# Patient Record
Sex: FEMALE | Race: WHITE | NOT HISPANIC OR LATINO | Employment: FULL TIME | URBAN - METROPOLITAN AREA
[De-identification: names, ages, dates, MRNs, and addresses within clinical notes are randomized per-mention and may not be internally consistent; named-entity substitution may affect disease eponyms.]

---

## 2023-08-10 ENCOUNTER — HOSPITAL ENCOUNTER (OUTPATIENT)
Dept: DATA CONVERSION | Facility: HOSPITAL | Age: 58
Discharge: HOME | End: 2023-08-10

## 2023-08-10 DIAGNOSIS — E88.810 METABOLIC SYNDROME: ICD-10-CM

## 2023-08-10 LAB
ALBUMIN SERPL-MCNC: 4.3 GM/DL (ref 3.5–5)
ALBUMIN/GLOB SERPL: 1.4 RATIO (ref 1.5–3)
ALP BLD-CCNC: 99 U/L (ref 35–125)
ALT SERPL-CCNC: 19 U/L (ref 5–40)
ANION GAP SERPL CALCULATED.3IONS-SCNC: 12 MMOL/L (ref 0–19)
AST SERPL-CCNC: 19 U/L (ref 5–40)
BILIRUB SERPL-MCNC: 0.6 MG/DL (ref 0.1–1.2)
BUN SERPL-MCNC: 14 MG/DL (ref 8–25)
BUN/CREAT SERPL: 17.5 RATIO (ref 8–21)
CALCIUM SERPL-MCNC: 9.7 MG/DL (ref 8.5–10.4)
CHLORIDE SERPL-SCNC: 104 MMOL/L (ref 97–107)
CO2 SERPL-SCNC: 26 MMOL/L (ref 24–31)
CREAT SERPL-MCNC: 0.8 MG/DL (ref 0.4–1.6)
GFR SERPL CREATININE-BSD FRML MDRD: 87 ML/MIN/1.73 M2
GLOBULIN SER-MCNC: 3.1 G/DL (ref 1.9–3.7)
GLUCOSE SERPL-MCNC: 101 MG/DL (ref 65–99)
HBA1C MFR BLD: 6.5 % (ref 4–6)
POTASSIUM SERPL-SCNC: 3.8 MMOL/L (ref 3.4–5.1)
PROT SERPL-MCNC: 7.4 G/DL (ref 5.9–7.9)
SODIUM SERPL-SCNC: 142 MMOL/L (ref 133–145)

## 2023-09-18 ENCOUNTER — HOSPITAL ENCOUNTER (OUTPATIENT)
Dept: DATA CONVERSION | Facility: HOSPITAL | Age: 58
Discharge: HOME | End: 2023-09-18
Payer: COMMERCIAL

## 2023-09-18 DIAGNOSIS — E55.9 VITAMIN D DEFICIENCY, UNSPECIFIED: ICD-10-CM

## 2023-09-18 DIAGNOSIS — E78.5 HYPERLIPIDEMIA, UNSPECIFIED: ICD-10-CM

## 2023-09-18 PROBLEM — N95.1 FEMALE CLIMACTERIC STATE: Status: ACTIVE | Noted: 2023-09-18

## 2023-09-18 PROBLEM — R92.8 ABNORMAL MAMMOGRAM: Status: ACTIVE | Noted: 2023-09-18

## 2023-09-18 LAB
25(OH)D3 SERPL-MCNC: 50 NG/ML (ref 31–100)
APPEARANCE PLAS: ABNORMAL
CHOLEST SERPL-MCNC: 154 MG/DL (ref 133–200)
CHOLEST/HDLC SERPL: 3.4 RATIO
COLOR SPUN FLD: ABNORMAL
FASTING STATUS PATIENT QL REPORTED: ABNORMAL
HDLC SERPL-MCNC: 45 MG/DL
LDLC SERPL CALC-MCNC: 72 MG/DL (ref 65–130)
TRIGL SERPL-MCNC: 185 MG/DL (ref 40–150)

## 2023-09-18 RX ORDER — IBUPROFEN 200 MG
200 TABLET ORAL AS NEEDED
COMMUNITY
End: 2024-03-19 | Stop reason: ALTCHOICE

## 2023-09-20 PROBLEM — E88.810 METABOLIC SYNDROME: Status: ACTIVE | Noted: 2023-09-20

## 2023-10-23 ENCOUNTER — CLINICAL SUPPORT (OUTPATIENT)
Dept: CARE COORDINATION | Facility: CLINIC | Age: 58
End: 2023-10-23
Payer: COMMERCIAL

## 2023-10-23 NOTE — PROGRESS NOTES
Met with patient for nutrition counseling follow up. Her weight is 235 lbs, down one pound since our last visit. Her most recent A1c on 8/10/2023 was 6.5, that worries her. She states she is no longer on Ozempic, it made her very sick. Patient reports having a hard month, a close friend's daughter is terminally ill. She reports struggling with her emotions and emotional eating. She is really trying to remember that eating due to emotions will not help in the long run. She is also trying other methods of self care like talking to a friend, reading. She has been drinking flavored hot tea in the evening in place of something sweet and has been trying some new ways of cooking. She is really trying to make more meals from scratch rather than relying on frozen meals. She is also being a lot more aware of added sugar and portions, she is reading labels and limiting items that have added sugar. She is also still making effort to make healthier choices when out to eat. She reports still struggling a little with water intake. She plans on looking for a new water bottle with reminders on it. She feels this may help. We reviewed some ideas around mind set and creating behaviors that turn into lifestyle changes. We also reviewed portions, carbohydrates, added sugars, the plate method, and behaviors that will improve her A1c.  She reports feels optimistic and also anxious going into the holiday season with so many situations involving food. All questions were answered and I provided a few handouts on portions and using the plate method. Continue working on goals previously established: consistent exercise routine, consistent water intake ~64 oz daily, continue working on healthy cooking methods, continue limiting added sugars, continue using mindful techniques to limit emotional eating.

## 2023-11-06 ENCOUNTER — ANCILLARY PROCEDURE (OUTPATIENT)
Dept: RADIOLOGY | Facility: CLINIC | Age: 58
End: 2023-11-06
Payer: COMMERCIAL

## 2023-11-06 VITALS — BODY MASS INDEX: 42.17 KG/M2 | HEIGHT: 63 IN | WEIGHT: 238 LBS

## 2023-11-06 DIAGNOSIS — Z12.31 ENCOUNTER FOR SCREENING MAMMOGRAM FOR MALIGNANT NEOPLASM OF BREAST: ICD-10-CM

## 2023-11-06 PROCEDURE — 77067 SCR MAMMO BI INCL CAD: CPT

## 2023-12-04 ENCOUNTER — TELEMEDICINE CLINICAL SUPPORT (OUTPATIENT)
Dept: CARE COORDINATION | Facility: CLINIC | Age: 58
End: 2023-12-04
Payer: COMMERCIAL

## 2023-12-04 NOTE — PROGRESS NOTES
Met with patient for nutrition counseling follow up via agreed upon virtual visit. She took her weight at home, 235 lbs. The same since our last visit, she is glad because she expected to gain weight. She reports seeing endocrinologist. She was encouraged to decrease carbohydrates, Plans to retest A1c after the new year since she was at 6.5 8/2023. She recently started Metformin. She is very worried about a diabetes diagnosis. She feels when she is on a strict diet she cannot sustain it for very long and it makes her almost want to give up and over-indulge. We discussed the importance of making sustainable changes so they become part of her lifestyle. She is not good with breakfast and usually skips, She tried having a protein shake recently. She reports her schedule and routine has been different with the holidays. She has been eating out more and a lot more treats have been around. She does want to get back to an exercise routine. We discussed how this can help her blood sugar. We discussed a plan for getting back into an exercise routine. She had questions regarding lunch ideas, I provided some ideas and suggestions. We also reviewed the importance of vegetables at meals and limiting ultra processed carbohydrates such as cookies, donuts, chips, pretzels, etc. We discussed having fruit instead of other sweet treats. Together we came up with goals she feels she can work on going forward: no more than 2 fruits a day, re-start exercise routine with the goal to work up to 150 mins a week split up, increase vegetable intake at lunch and dinner, limit ultra processed carbohydrates, continue food log.

## 2024-01-22 ENCOUNTER — TELEMEDICINE CLINICAL SUPPORT (OUTPATIENT)
Dept: CARE COORDINATION | Facility: CLINIC | Age: 59
End: 2024-01-22
Payer: COMMERCIAL

## 2024-01-22 NOTE — PROGRESS NOTES
Met with patient for nutrition counseling follow up via agreed upon virtual visit. Patient reports her weight is about 241 lbs. She has re-assessed her approach and recently joined [employer]. She feels this will help having more community and group support, She also recently got a bluetooth scale that automatically records her weight on the liana. She feels this will be helpful, in the past she would leave off the decimal points when recording her weight or she would forget the number. So this will give better data to track. She reports still having some sweet treats around the house and this is challenging. She recently moved her treadmill from the corner of a spare room to her larger family room and she knows this will make it more accessible. She explains her  really likes to eat out, but she prefers to eat at home. She tries to find a balance, she wants to compromise with him but also is serious about her goals. They recently had a bad meal eating out and hopes this will deter him from offering to eat out. She is curious about ideas for healthier sweet treats when she has a craving. I provided some recipes via email. We discussed how change is difficult and creating new habits can take time. We discussed how it will become easier as habits become consistent. We discussed her source of motivation and having some heart to heart conversations with her  if needed. We also discussed getting rid of chocolate and other treats that are easy to overeat and how setting up her environment is so important. She is also challenged by drinking water, we discussed some strategies. All questions were answered. Goals to continue working on: tracking with [employer] liana, increasing water intake, remove candy dish, continue working on consistent exercise routine, limit ultra processed snacks such as chips.

## 2024-02-26 ENCOUNTER — APPOINTMENT (OUTPATIENT)
Dept: CARE COORDINATION | Facility: CLINIC | Age: 59
End: 2024-02-26
Payer: COMMERCIAL

## 2024-03-11 ENCOUNTER — TELEMEDICINE CLINICAL SUPPORT (OUTPATIENT)
Dept: CARE COORDINATION | Facility: CLINIC | Age: 59
End: 2024-03-11
Payer: COMMERCIAL

## 2024-03-11 NOTE — PROGRESS NOTES
Met with patient for nutrition counseling follow up via agreed upon virtual visit. She states her weight is 241 lbs, the same since our last visit. She explains she was sick and it has been lingering for about 3 weeks, her knee is popping and she has not been able to exercise. She explains weight watchers has not worked for her, she feels it was way too restrictive. It was a horrible experience for her. She feels confused on what to focus on but feels this is a good time to start fresh. She really wants to focus on nutrition since she is not able to exercise. She knows she needs to work on water intake, she got some sugar free flavoring for water and plans on trying that. She feels sharing her food diary with me will be helpful, I agreed and she plans on sending me her food diary so I can provide feedback. She also notices when she has a calorie goal she will try to fit in as many food items that she can. Ex; low calories items like sugar free popsicles or fudgsicles. We discussed how that is not necessarily a bad idea. However, some of those foods may not be filling or satisfying due to lacking in nutrition. We also discussed paying attention to protein, aiming to get a protein sources at each meal and snack and the importance of including protein at breakfast.   Goals until our next visit: work on increasing water intake, use sugar free water additives if needed, try fresh lemon or orange slices if you like too. Work on keeping a food diary and aim to stay around 7741-8461 calories a day and share with me for feedback. Aim to include protein at each meal and snacks when able, especially at breakfast. Protein sources- eggs, egg whites, lean meats, seafood, cottage cheese, yogurt, beans, nuts, edamame, quinoa.) Start paying attention to fullness after low calorie items like sugar free fudgsicles. Are they filling/satisfying? Also make a few lists of healthy foods that you enjoy in order to get some good ideas of  items to pick from.

## 2024-03-19 ENCOUNTER — LAB (OUTPATIENT)
Dept: LAB | Facility: LAB | Age: 59
End: 2024-03-19
Payer: COMMERCIAL

## 2024-03-19 ENCOUNTER — OFFICE VISIT (OUTPATIENT)
Dept: PRIMARY CARE | Facility: CLINIC | Age: 59
End: 2024-03-19
Payer: COMMERCIAL

## 2024-03-19 VITALS
HEART RATE: 104 BPM | TEMPERATURE: 97.6 F | BODY MASS INDEX: 43.16 KG/M2 | OXYGEN SATURATION: 97 % | WEIGHT: 243.6 LBS | DIASTOLIC BLOOD PRESSURE: 90 MMHG | SYSTOLIC BLOOD PRESSURE: 140 MMHG | HEIGHT: 63 IN

## 2024-03-19 DIAGNOSIS — R53.83 TIRED: ICD-10-CM

## 2024-03-19 DIAGNOSIS — E55.9 VITAMIN D DEFICIENCY: ICD-10-CM

## 2024-03-19 DIAGNOSIS — E03.9 ACQUIRED HYPOTHYROIDISM: ICD-10-CM

## 2024-03-19 DIAGNOSIS — E78.2 MIXED HYPERLIPIDEMIA: ICD-10-CM

## 2024-03-19 DIAGNOSIS — E66.01 CLASS 3 SEVERE OBESITY DUE TO EXCESS CALORIES WITH SERIOUS COMORBIDITY AND BODY MASS INDEX (BMI) OF 40.0 TO 44.9 IN ADULT (MULTI): ICD-10-CM

## 2024-03-19 DIAGNOSIS — E78.2 MIXED HYPERLIPIDEMIA: Primary | ICD-10-CM

## 2024-03-19 DIAGNOSIS — R73.9 HYPERGLYCEMIA: ICD-10-CM

## 2024-03-19 DIAGNOSIS — Z13.6 SCREENING FOR HEART DISEASE: ICD-10-CM

## 2024-03-19 LAB
25(OH)D3 SERPL-MCNC: 47 NG/ML (ref 30–100)
ALBUMIN SERPL BCP-MCNC: 4.6 G/DL (ref 3.4–5)
ALP SERPL-CCNC: 97 U/L (ref 33–110)
ALT SERPL W P-5'-P-CCNC: 18 U/L (ref 7–45)
ANION GAP SERPL CALC-SCNC: 13 MMOL/L (ref 10–20)
AST SERPL W P-5'-P-CCNC: 19 U/L (ref 9–39)
BASOPHILS # BLD AUTO: 0.1 X10*3/UL (ref 0–0.1)
BASOPHILS NFR BLD AUTO: 1.2 %
BILIRUB SERPL-MCNC: 0.4 MG/DL (ref 0–1.2)
BUN SERPL-MCNC: 14 MG/DL (ref 6–23)
CALCIUM SERPL-MCNC: 10.2 MG/DL (ref 8.6–10.3)
CHLORIDE SERPL-SCNC: 102 MMOL/L (ref 98–107)
CHOLEST SERPL-MCNC: 191 MG/DL (ref 0–199)
CHOLESTEROL/HDL RATIO: 3.5
CO2 SERPL-SCNC: 28 MMOL/L (ref 21–32)
CREAT SERPL-MCNC: 0.74 MG/DL (ref 0.5–1.05)
EGFRCR SERPLBLD CKD-EPI 2021: >90 ML/MIN/1.73M*2
EOSINOPHIL # BLD AUTO: 0.62 X10*3/UL (ref 0–0.7)
EOSINOPHIL NFR BLD AUTO: 7.7 %
ERYTHROCYTE [DISTWIDTH] IN BLOOD BY AUTOMATED COUNT: 13.6 % (ref 11.5–14.5)
EST. AVERAGE GLUCOSE BLD GHB EST-MCNC: 114 MG/DL
GLUCOSE SERPL-MCNC: 96 MG/DL (ref 74–99)
HBA1C MFR BLD: 5.6 %
HCT VFR BLD AUTO: 44.9 % (ref 36–46)
HDLC SERPL-MCNC: 55.1 MG/DL
HGB BLD-MCNC: 14.2 G/DL (ref 12–16)
IMM GRANULOCYTES # BLD AUTO: 0.03 X10*3/UL (ref 0–0.7)
IMM GRANULOCYTES NFR BLD AUTO: 0.4 % (ref 0–0.9)
LDLC SERPL CALC-MCNC: 75 MG/DL
LYMPHOCYTES # BLD AUTO: 2.65 X10*3/UL (ref 1.2–4.8)
LYMPHOCYTES NFR BLD AUTO: 33 %
MCH RBC QN AUTO: 28 PG (ref 26–34)
MCHC RBC AUTO-ENTMCNC: 31.6 G/DL (ref 32–36)
MCV RBC AUTO: 89 FL (ref 80–100)
MONOCYTES # BLD AUTO: 0.51 X10*3/UL (ref 0.1–1)
MONOCYTES NFR BLD AUTO: 6.4 %
NEUTROPHILS # BLD AUTO: 4.11 X10*3/UL (ref 1.2–7.7)
NEUTROPHILS NFR BLD AUTO: 51.3 %
NON HDL CHOLESTEROL: 136 MG/DL (ref 0–149)
NRBC BLD-RTO: 0 /100 WBCS (ref 0–0)
PLATELET # BLD AUTO: 310 X10*3/UL (ref 150–450)
POTASSIUM SERPL-SCNC: 4 MMOL/L (ref 3.5–5.3)
PROT SERPL-MCNC: 7.6 G/DL (ref 6.4–8.2)
RBC # BLD AUTO: 5.07 X10*6/UL (ref 4–5.2)
SODIUM SERPL-SCNC: 139 MMOL/L (ref 136–145)
TRIGL SERPL-MCNC: 305 MG/DL (ref 0–149)
TSH SERPL-ACNC: 3.56 MIU/L (ref 0.44–3.98)
VIT B12 SERPL-MCNC: 1253 PG/ML (ref 211–911)
VLDL: 61 MG/DL (ref 0–40)
WBC # BLD AUTO: 8 X10*3/UL (ref 4.4–11.3)

## 2024-03-19 PROCEDURE — 83036 HEMOGLOBIN GLYCOSYLATED A1C: CPT

## 2024-03-19 PROCEDURE — 1036F TOBACCO NON-USER: CPT | Performed by: FAMILY MEDICINE

## 2024-03-19 PROCEDURE — 82607 VITAMIN B-12: CPT

## 2024-03-19 PROCEDURE — 3008F BODY MASS INDEX DOCD: CPT | Performed by: FAMILY MEDICINE

## 2024-03-19 PROCEDURE — 82306 VITAMIN D 25 HYDROXY: CPT

## 2024-03-19 PROCEDURE — 36415 COLL VENOUS BLD VENIPUNCTURE: CPT

## 2024-03-19 PROCEDURE — 99204 OFFICE O/P NEW MOD 45 MIN: CPT | Performed by: FAMILY MEDICINE

## 2024-03-19 RX ORDER — SIMVASTATIN 20 MG/1
20 TABLET, FILM COATED ORAL DAILY
COMMUNITY

## 2024-03-19 RX ORDER — TITANIUM DIOXIDE, OCTINOXATE, ZINC OXIDE 4.61; 1.6; .78 G/40ML; G/40ML; G/40ML
1 CREAM TOPICAL
COMMUNITY

## 2024-03-19 RX ORDER — ACETAMINOPHEN 500 MG
5000 TABLET ORAL DAILY
COMMUNITY

## 2024-03-19 RX ORDER — LANOLIN ALCOHOL/MO/W.PET/CERES
1000 CREAM (GRAM) TOPICAL
COMMUNITY

## 2024-03-19 RX ORDER — BISACODYL 5 MG/1
1 TABLET, COATED ORAL
COMMUNITY

## 2024-03-19 RX ORDER — METFORMIN HYDROCHLORIDE 500 MG/1
1000 TABLET, EXTENDED RELEASE ORAL
Qty: 360 TABLET | Refills: 3 | Status: SHIPPED | OUTPATIENT
Start: 2024-03-19 | End: 2025-03-19

## 2024-03-19 RX ORDER — METFORMIN HYDROCHLORIDE 500 MG/1
1000 TABLET, EXTENDED RELEASE ORAL
COMMUNITY
End: 2024-03-19 | Stop reason: SDUPTHER

## 2024-03-19 ASSESSMENT — ENCOUNTER SYMPTOMS
DIZZINESS: 0
OCCASIONAL FEELINGS OF UNSTEADINESS: 0
SHORTNESS OF BREATH: 0
NAUSEA: 0
DIARRHEA: 0
FEVER: 0
DIFFICULTY URINATING: 0
DEPRESSION: 0
LOSS OF SENSATION IN FEET: 0
ENDOCRINE NEGATIVE: 1

## 2024-03-19 ASSESSMENT — PATIENT HEALTH QUESTIONNAIRE - PHQ9
2. FEELING DOWN, DEPRESSED OR HOPELESS: NOT AT ALL
SUM OF ALL RESPONSES TO PHQ9 QUESTIONS 1 AND 2: 0
1. LITTLE INTEREST OR PLEASURE IN DOING THINGS: NOT AT ALL

## 2024-03-19 ASSESSMENT — PAIN SCALES - GENERAL: PAINLEVEL: 0-NO PAIN

## 2024-03-19 NOTE — PROGRESS NOTES
"Subjective   Patient ID: Penelope Crouch is a 58 y.o. female who presents for NP est.    High cholesterol  Low vitamin d  Fatigue  Overweight/obesity-had tried phentermine in the past, has had DM injections-make her feel sick         Review of Systems   Constitutional:  Negative for fever.        Also see HPI   Eyes:  Negative for visual disturbance.   Respiratory:  Negative for shortness of breath.    Cardiovascular:  Negative for chest pain.   Gastrointestinal:  Negative for diarrhea and nausea.   Endocrine: Negative.    Genitourinary:  Negative for difficulty urinating.   Skin:  Negative for rash.   Neurological:  Negative for dizziness.        No focal deficits   Psychiatric/Behavioral:  Negative for suicidal ideas.    All other systems reviewed and are negative.      Objective   /90   Pulse 104   Temp 36.4 °C (97.6 °F)   Ht 1.594 m (5' 2.75\")   Wt 110 kg (243 lb 9.6 oz)   SpO2 97%   BMI 43.50 kg/m²     Physical Exam  Vitals and nursing note reviewed.   Constitutional:       Appearance: Normal appearance.   HENT:      Head: Normocephalic and atraumatic.   Eyes:      Extraocular Movements: Extraocular movements intact.      Conjunctiva/sclera: Conjunctivae normal.   Cardiovascular:      Rate and Rhythm: Normal rate and regular rhythm.      Heart sounds: Normal heart sounds.   Pulmonary:      Effort: Pulmonary effort is normal.      Breath sounds: Normal breath sounds.      Comments: Lungs essentially CTA b/l  Abdominal:      General: There is no distension.      Palpations: Abdomen is soft. There is no mass.      Tenderness: There is no abdominal tenderness.   Musculoskeletal:      Right lower leg: No edema.      Left lower leg: No edema.   Skin:     Coloration: Skin is not jaundiced.      Findings: No rash.   Neurological:      General: No focal deficit present.      Mental Status: She is alert and oriented to person, place, and time.   Psychiatric:         Mood and Affect: Mood normal.         " Behavior: Behavior normal.         Thought Content: Thought content normal.         Judgment: Judgment normal.       Assessment/Plan   Diagnoses and all orders for this visit:  Mixed hyperlipidemia  -     Comprehensive Metabolic Panel; Future  -     TSH with reflex to Free T4 if abnormal; Future  -     Lipid Panel; Future  -     CBC and Auto Differential; Future  -     Vitamin B12; Future  Screening for heart disease  -     Lipid Panel; Future  -     CT cardiac scoring wo IV contrast; Future  Hyperglycemia  -     metFORMIN  mg 24 hr tablet; Take 2 tablets (1,000 mg) by mouth 2 times a day with meals.  -     Comprehensive Metabolic Panel; Future  -     Vitamin B12; Future  -     Hemoglobin A1C; Future  Vitamin D deficiency  -     Vitamin D 25-Hydroxy,Total (for eval of Vitamin D levels); Future  Tired  -     Comprehensive Metabolic Panel; Future  -     TSH with reflex to Free T4 if abnormal; Future  -     CBC and Auto Differential; Future  -     Vitamin B12; Future  -     Vitamin D 25-Hydroxy,Total (for eval of Vitamin D levels); Future  Class 3 severe obesity due to excess calories with serious comorbidity and body mass index (BMI) of 40.0 to 44.9 in adult (CMS/[facility])

## 2024-03-19 NOTE — LETTER
March 19, 2024     Patient: Penelope Crouch   YOB: 1965   Date of Visit: 3/19/2024       To Whom It May Concern:    Penelope Crouch was seen in my clinic on 3/19/2024 at 8:00 am. Please excuse Penelope for her absence from work on this day to make the appointment.    If you have any questions or concerns, please don't hesitate to call.         Sincerely,         Scott Chaudhary,         CC: No Recipients

## 2024-03-22 ENCOUNTER — TELEPHONE (OUTPATIENT)
Dept: PRIMARY CARE | Facility: CLINIC | Age: 59
End: 2024-03-22
Payer: COMMERCIAL

## 2024-03-22 NOTE — TELEPHONE ENCOUNTER
Patient would like to have Mounjaro sent to the pharmacy to see if it will be covered. The Contrave was denied

## 2024-03-26 ENCOUNTER — TELEPHONE (OUTPATIENT)
Dept: PRIMARY CARE | Facility: CLINIC | Age: 59
End: 2024-03-26

## 2024-03-26 NOTE — TELEPHONE ENCOUNTER
Patient called to follow up david been sent to pharm to see if it will be covered since contrave was not cover

## 2024-03-27 NOTE — RESULT ENCOUNTER NOTE
Elevated triglyceride levels at 305 ------with normal levels defined as <150 mg/dL, hypertriglyceridemia can be classified as mild (triglycerides 151-499 mg/dL), moderate (triglycerides 500-999 mg/dL), and severe (triglycerides >1000 mg/dL).     Triglycerides are a type of fat that stores excess energy from your diet. Some tips for improving your triglyceride levels are:  1) The American Heart Association recommends 30 minutes of moderate-intensity aerobic exercise 5 days a week (or frequency to total 150 minutes a week)  2) Losing weight with goals of achieving a healthy BMI range of 18.5 to 24.9 kg/m2  3) Drinking alcohol in moderation   4) Avoiding smoking   5) Dietary changes:   -Avoid refined carbohydrates such as sugar and foods made with white flour or fructose which can increase triglycerides.  -Eating whole grains and cutting back on sugared soda can help control triglycerides.   -Omega-3 fats in salmon, tuna, sardines, and other fatty fish can lower triglycerides  -Cutting back on saturated fat in red meat and full-fat dairy foods  Despite having elevated triglyceride level of 305, overall cholesterol HDL ratio is 3.5 which is fantastic and the LDL number is 75 which is also fantastic  Hemoglobin A1c is 5.6 which is not in the diet range, this is a 3-month average glucose level marker  Although the TSH is 3.56 within the normal range, this could reflect a trend of underactive thyroid function, I recommend that we would have you check a TSH level in 6 weeks for reassessment, the order has been placed.  No fasting required to check this TSH level for thyroid function, no paperwork needed, just go to a  lab in approximately 6 weeks  The other labs are essentially normal  There is no problem with having an elevated vitamin B12 level

## 2024-04-03 ENCOUNTER — TELEMEDICINE (OUTPATIENT)
Dept: PRIMARY CARE | Facility: CLINIC | Age: 59
End: 2024-04-03
Payer: COMMERCIAL

## 2024-04-03 DIAGNOSIS — E78.2 MIXED HYPERLIPIDEMIA: Primary | ICD-10-CM

## 2024-04-03 DIAGNOSIS — E66.01 CLASS 3 SEVERE OBESITY DUE TO EXCESS CALORIES WITH SERIOUS COMORBIDITY AND BODY MASS INDEX (BMI) OF 40.0 TO 44.9 IN ADULT (MULTI): ICD-10-CM

## 2024-04-03 PROCEDURE — 99213 OFFICE O/P EST LOW 20 MIN: CPT | Performed by: FAMILY MEDICINE

## 2024-04-03 PROCEDURE — 3008F BODY MASS INDEX DOCD: CPT | Performed by: FAMILY MEDICINE

## 2024-04-03 PROCEDURE — 1036F TOBACCO NON-USER: CPT | Performed by: FAMILY MEDICINE

## 2024-04-03 RX ORDER — TIRZEPATIDE 2.5 MG/.5ML
2.5 INJECTION, SOLUTION SUBCUTANEOUS
Qty: 2 ML | Refills: 0 | Status: SHIPPED | OUTPATIENT
Start: 2024-04-03 | End: 2024-04-25

## 2024-04-15 ENCOUNTER — TELEMEDICINE CLINICAL SUPPORT (OUTPATIENT)
Dept: CARE COORDINATION | Facility: CLINIC | Age: 59
End: 2024-04-15
Payer: COMMERCIAL

## 2024-04-15 ENCOUNTER — APPOINTMENT (OUTPATIENT)
Dept: CARE COORDINATION | Facility: CLINIC | Age: 59
End: 2024-04-15
Payer: COMMERCIAL

## 2024-04-15 NOTE — PROGRESS NOTES
Met with patient for nutrition counseling follow up via agreed upon virtual visit. Patient reports her weight is 242 lbs, she is maintaining since our last visit. She explains life has been hectic helping take care of her mom. She finds it difficult to make herself a priority. She did do a food diary for a few days and states she notices where she is having trouble. Most of her calories are later in the day. We discussed how that encourages not being hungry in the morning. She explains her mornings are hectic and she is always in a rush, she typically likes to stay up later at night until she is exhausted. She states she will work on getting ready a healthy lunch and small breakfast the night before. She also plans on walking with her friend soon. She is having a hard time coming up with lunch ideas, we discussed a few ideas and I provided info via email. She also notices she drinks water during the day but not as much after work.   Goals until our next visit: fill up water bottle as soon as she gets home from work, start walking with her friend for exercise, try edna box style lunches and prepare the night before, have a piece of fruit in the morning/on way to work and have it ready to grab and go so it's easy in the morning, set lunch box out by cabinet/pills as a reminder.

## 2024-04-25 DIAGNOSIS — E66.01 CLASS 3 SEVERE OBESITY DUE TO EXCESS CALORIES WITH SERIOUS COMORBIDITY AND BODY MASS INDEX (BMI) OF 40.0 TO 44.9 IN ADULT (MULTI): ICD-10-CM

## 2024-04-25 RX ORDER — TIRZEPATIDE 2.5 MG/.5ML
2.5 INJECTION, SOLUTION SUBCUTANEOUS
Qty: 2 ML | Refills: 2 | Status: SHIPPED | OUTPATIENT
Start: 2024-04-28

## 2024-04-29 ASSESSMENT — ENCOUNTER SYMPTOMS
SHORTNESS OF BREATH: 0
DIARRHEA: 0
FEVER: 0
NAUSEA: 0
ENDOCRINE NEGATIVE: 1
DIZZINESS: 0
DIFFICULTY URINATING: 0

## 2024-04-29 NOTE — PROGRESS NOTES
Subjective   Patient ID: Liz Crouch is a 58 y.o. female who presents for No chief complaint on file..    Telemedicine visit audio and video   high cholesterol  Obesity  History of hyperglycemia, metabolic syndrome         Review of Systems   Constitutional:  Negative for fever.        Also see HPI   Eyes:  Negative for visual disturbance.   Respiratory:  Negative for shortness of breath.    Cardiovascular:  Negative for chest pain.   Gastrointestinal:  Negative for diarrhea and nausea.   Endocrine: Negative.    Genitourinary:  Negative for difficulty urinating.   Skin:  Negative for rash.   Neurological:  Negative for dizziness.        No focal deficits   Psychiatric/Behavioral:  Negative for suicidal ideas.    All other systems reviewed and are negative.      Objective   There were no vitals taken for this visit.    Physical Exam  Constitutional:       Appearance: Normal appearance.   HENT:      Head: Normocephalic and atraumatic.   Pulmonary:      Effort: Pulmonary effort is normal.      Comments: Lungs essentially CTA b/l  Neurological:      General: No focal deficit present.      Mental Status: She is alert and oriented to person, place, and time.   Psychiatric:         Mood and Affect: Mood normal.         Thought Content: Thought content normal.         Judgment: Judgment normal.         Assessment/Plan   Diagnoses and all orders for this visit:  Mixed hyperlipidemia  Class 3 severe obesity due to excess calories with serious comorbidity and body mass index (BMI) of 40.0 to 44.9 in adult (Multi)

## 2024-05-06 ENCOUNTER — LAB (OUTPATIENT)
Dept: LAB | Facility: LAB | Age: 59
End: 2024-05-06
Payer: COMMERCIAL

## 2024-05-06 DIAGNOSIS — E03.9 ACQUIRED HYPOTHYROIDISM: ICD-10-CM

## 2024-05-06 LAB — TSH SERPL-ACNC: 1.85 MIU/L (ref 0.44–3.98)

## 2024-05-06 PROCEDURE — 36415 COLL VENOUS BLD VENIPUNCTURE: CPT

## 2024-05-23 PROBLEM — E66.01 OBESITY, CLASS III, BMI 40-49.9 (MORBID OBESITY) (MULTI): Status: ACTIVE | Noted: 2019-06-14

## 2024-05-23 PROBLEM — E78.5 HYPERLIPIDEMIA: Status: ACTIVE | Noted: 2024-03-19

## 2024-05-23 PROBLEM — E66.813 OBESITY, CLASS III, BMI 40-49.9 (MORBID OBESITY): Status: ACTIVE | Noted: 2019-06-14

## 2024-05-23 PROBLEM — E03.9 ACQUIRED HYPOTHYROIDISM: Status: ACTIVE | Noted: 2024-05-23

## 2024-05-23 PROBLEM — E55.9 VITAMIN D DEFICIENCY: Status: ACTIVE | Noted: 2018-05-18

## 2024-05-23 PROBLEM — R92.8 ABNORMAL MAMMOGRAM: Status: RESOLVED | Noted: 2023-09-18 | Resolved: 2024-05-23

## 2024-05-23 PROBLEM — E66.01 SEVERE OBESITY (MULTI): Status: ACTIVE | Noted: 2024-05-23

## 2024-05-23 PROBLEM — R53.83 TIRED: Status: ACTIVE | Noted: 2024-03-19

## 2024-05-30 ENCOUNTER — HOSPITAL ENCOUNTER (OUTPATIENT)
Dept: RADIOLOGY | Facility: CLINIC | Age: 59
Discharge: HOME | End: 2024-05-30
Payer: COMMERCIAL

## 2024-05-30 DIAGNOSIS — Z13.6 SCREENING FOR HEART DISEASE: ICD-10-CM

## 2024-05-30 PROCEDURE — 75571 CT HRT W/O DYE W/CA TEST: CPT

## 2024-06-03 ENCOUNTER — HOSPITAL ENCOUNTER (OUTPATIENT)
Dept: RADIOLOGY | Facility: CLINIC | Age: 59
Discharge: HOME | End: 2024-06-03
Payer: COMMERCIAL

## 2024-06-03 ENCOUNTER — OFFICE VISIT (OUTPATIENT)
Dept: ORTHOPEDIC SURGERY | Facility: CLINIC | Age: 59
End: 2024-06-03
Payer: COMMERCIAL

## 2024-06-03 VITALS — BODY MASS INDEX: 43.3 KG/M2 | WEIGHT: 242.51 LBS

## 2024-06-03 DIAGNOSIS — R52 PAIN: ICD-10-CM

## 2024-06-03 DIAGNOSIS — M17.11 PRIMARY OSTEOARTHRITIS OF RIGHT KNEE: ICD-10-CM

## 2024-06-03 DIAGNOSIS — M25.561 RIGHT KNEE PAIN, UNSPECIFIED CHRONICITY: Primary | ICD-10-CM

## 2024-06-03 PROCEDURE — 99203 OFFICE O/P NEW LOW 30 MIN: CPT | Performed by: PHYSICIAN ASSISTANT

## 2024-06-03 PROCEDURE — 73560 X-RAY EXAM OF KNEE 1 OR 2: CPT | Mod: BILATERAL PROCEDURE | Performed by: ORTHOPAEDIC SURGERY

## 2024-06-03 PROCEDURE — 3008F BODY MASS INDEX DOCD: CPT | Performed by: PHYSICIAN ASSISTANT

## 2024-06-03 PROCEDURE — 1036F TOBACCO NON-USER: CPT | Performed by: PHYSICIAN ASSISTANT

## 2024-06-03 PROCEDURE — 73560 X-RAY EXAM OF KNEE 1 OR 2: CPT | Mod: 50

## 2024-06-03 PROCEDURE — 99213 OFFICE O/P EST LOW 20 MIN: CPT | Performed by: PHYSICIAN ASSISTANT

## 2024-06-03 ASSESSMENT — PAIN - FUNCTIONAL ASSESSMENT: PAIN_FUNCTIONAL_ASSESSMENT: 0-10

## 2024-06-03 ASSESSMENT — ENCOUNTER SYMPTOMS
OCCASIONAL FEELINGS OF UNSTEADINESS: 0
DEPRESSION: 0
LOSS OF SENSATION IN FEET: 0

## 2024-06-03 ASSESSMENT — PAIN SCALES - GENERAL: PAINLEVEL_OUTOF10: 5 - MODERATE PAIN

## 2024-06-03 ASSESSMENT — PATIENT HEALTH QUESTIONNAIRE - PHQ9
SUM OF ALL RESPONSES TO PHQ9 QUESTIONS 1 AND 2: 0
1. LITTLE INTEREST OR PLEASURE IN DOING THINGS: NOT AT ALL
2. FEELING DOWN, DEPRESSED OR HOPELESS: NOT AT ALL

## 2024-06-03 ASSESSMENT — LIFESTYLE VARIABLES: TOTAL SCORE: 0

## 2024-06-03 ASSESSMENT — PAIN DESCRIPTION - DESCRIPTORS: DESCRIPTORS: ACHING

## 2024-06-03 NOTE — PATIENT INSTRUCTIONS
Thank you for coming to see us today!     Continue to use tylenol for pain control.   Rest, ice and elevate and remember to do exercises like walking, stretching, swimming, yoga  Voltaren gel  Compression sleeve for support    Follow up  as needed for cortisone injection

## 2024-06-03 NOTE — PROGRESS NOTES
Subjective      Chief Complaint   Patient presents with    Right Knee - Pain        HPI  This 58 year old patient presents today with  right knee pain. The patient states that this right knee pain has been present for 2 months. No previous operations or interventions for the right knee pain. The patient rates the knee pain as 5. The patient states that knee pain is worse with and aggravated by activity and bearing weight. The patient has tried ibuprofen and tylenol with no relief. Patient requests a discussion of further treatment options on examination today.     CARDIOLOGY:   Negative for chest pain, shortness of breath.   RESPIRATORY:   Negative for chest pain, shortness of breath.   MUSCULOSKELETAL:   See HPI for details.   NEUROLOGY:   Negative for tingling, numbness, weakness.    Objective    There were no vitals filed for this visit.    Knee Exam  Constitutional: Appears stated age. No apparent distress  Labored Breathing: No  Psychiatric: Normal mood and effect.   Neurological: alert and oriented x3  Skin: intact  MUSCULOSKELETAL: Neck: No tenderness. No pain or limitation with range of motion. Back: No tenderness. Straight leg test negative bilaterally. right knee: There is diffuse tenderness over the knee at the medial compartment. full range of motion McMurrays is equivocal. Anterior drawer and lachmans are negative. There is not an effusion present. The knee is stable to valgus and varus stressing. The patient walks with a painful gait favoring the right knee while walking.    CT cardiac scoring wo IV contrast    Result Date: 5/30/2024  Interpreted By:  José Miguel Cid, STUDY: CT CARDIAC SCORING WO IV CONTRAST; 5/30/2024 9:19 am   INDICATION: Signs/Symptoms:screening.   COMPARISON: None.   ACCESSION NUMBER(S): [id number]   ORDERING CLINICIAN: RIK FERNANDES   TECHNIQUE: Using prospective ECG gating, CT scan of the coronary arteries was performed without intravenous contrast. Coronary calcium scoring  was  "performed according to the method of Agatston.   FINDINGS: The score and distribution of calcium in the coronary arteries is as follows:   LM 0, .21, 15 LCx 2.38, 2 RCA 37.75, 11   Total 279.34   The visualized ascending thoracic aorta measures 3 cm in diameter. The heart is normal in size. No pericardial effusion is present.   No gross evidence of mediastinal or hilar lymphadenopathy or masses is identified. The visualized segments of the lungs are normally expanded.   The main pulmonary artery, right and left pulmonary artery are normal in size.   The visualized subdiaphragmatic structures appear intact.       1. Coronary artery calcium score of 279.34*.   *Coronary Artery  Agatston score   Score  risk Very low 1-99 Mildly increased 100-299 Moderately increased >300 Moderate to severely increased >800   Donnie et al. JCCT 2016 (http://dx.doi.org/10.1016/j.[email or web address])   GAVIN 10-Year CHD Risk with Coronary Artery Calcification can be calcuate using link below [email or web address]. JACC 2015 (http://dx.[email or web address]/10.1016/j.j [email or web address])   Signed by: José Miguel Cid 5/30/2024 10:38 AM Dictation workstation:   [id number]     AP and lateral x-rays of the right knee done and read in office today show osteoarthritis of the right knee.     Penelope \"Liz\" was seen today for pain.  Diagnoses and all orders for this visit:  Right knee pain, unspecified chronicity (Primary)  Primary osteoarthritis of right knee  Options are discussed with the patient in detail. The patient is instructed regarding activity modification and risk for further injury with falling or trauma, ice, provider directed at home gentle strengthening and ROM exercises, and the appropriate use of Tylenol as needed for pain with its potential adverse reactions and side effects. The patient understands. Return as needed Please note that this report has been produced using speech " recognition software.  It may contain errors related to grammar, punctuation or spelling.  Electronically signed, but not reviewed.    Lucia Del Rosario PA-C

## 2024-06-10 ENCOUNTER — APPOINTMENT (OUTPATIENT)
Dept: CARE COORDINATION | Facility: CLINIC | Age: 59
End: 2024-06-10
Payer: COMMERCIAL

## 2024-07-03 ENCOUNTER — OFFICE VISIT (OUTPATIENT)
Dept: OBSTETRICS AND GYNECOLOGY | Facility: CLINIC | Age: 59
End: 2024-07-03
Payer: COMMERCIAL

## 2024-07-03 VITALS
WEIGHT: 240.8 LBS | HEIGHT: 63 IN | SYSTOLIC BLOOD PRESSURE: 142 MMHG | DIASTOLIC BLOOD PRESSURE: 88 MMHG | BODY MASS INDEX: 42.66 KG/M2

## 2024-07-03 DIAGNOSIS — Z11.51 SCREENING FOR HUMAN PAPILLOMAVIRUS: ICD-10-CM

## 2024-07-03 DIAGNOSIS — Z12.31 ENCOUNTER FOR SCREENING MAMMOGRAM FOR MALIGNANT NEOPLASM OF BREAST: ICD-10-CM

## 2024-07-03 DIAGNOSIS — N95.2 POSTMENOPAUSAL ATROPHIC VAGINITIS: ICD-10-CM

## 2024-07-03 DIAGNOSIS — Z78.0 MENOPAUSE: ICD-10-CM

## 2024-07-03 DIAGNOSIS — Z12.11 SCREEN FOR COLON CANCER: ICD-10-CM

## 2024-07-03 DIAGNOSIS — Z01.419 VISIT FOR GYNECOLOGIC EXAMINATION: Primary | ICD-10-CM

## 2024-07-03 PROCEDURE — 99396 PREV VISIT EST AGE 40-64: CPT | Performed by: OBSTETRICS & GYNECOLOGY

## 2024-07-03 PROCEDURE — 87624 HPV HI-RISK TYP POOLED RSLT: CPT | Performed by: OBSTETRICS & GYNECOLOGY

## 2024-07-03 PROCEDURE — 3008F BODY MASS INDEX DOCD: CPT | Performed by: OBSTETRICS & GYNECOLOGY

## 2024-07-03 ASSESSMENT — ENCOUNTER SYMPTOMS
ADENOPATHY: 0
ARTHRALGIAS: 1
COLOR CHANGE: 0
CHEST TIGHTNESS: 0
ABDOMINAL DISTENTION: 0
LOSS OF SENSATION IN FEET: 0
OCCASIONAL FEELINGS OF UNSTEADINESS: 0
DYSURIA: 0
JOINT SWELLING: 0
DIZZINESS: 0
ABDOMINAL PAIN: 0
HEADACHES: 0
UNEXPECTED WEIGHT CHANGE: 0
DIFFICULTY URINATING: 0
SHORTNESS OF BREATH: 0
FATIGUE: 0
BACK PAIN: 1
ACTIVITY CHANGE: 0
DEPRESSION: 0
WEAKNESS: 0

## 2024-07-03 ASSESSMENT — LIFESTYLE VARIABLES
SKIP TO QUESTIONS 9-10: 1
HOW OFTEN DO YOU HAVE A DRINK CONTAINING ALCOHOL: NEVER
HOW MANY STANDARD DRINKS CONTAINING ALCOHOL DO YOU HAVE ON A TYPICAL DAY: PATIENT DOES NOT DRINK
AUDIT-C TOTAL SCORE: 0
HOW OFTEN DO YOU HAVE SIX OR MORE DRINKS ON ONE OCCASION: NEVER

## 2024-07-03 ASSESSMENT — PATIENT HEALTH QUESTIONNAIRE - PHQ9
1. LITTLE INTEREST OR PLEASURE IN DOING THINGS: NOT AT ALL
2. FEELING DOWN, DEPRESSED OR HOPELESS: NOT AT ALL
SUM OF ALL RESPONSES TO PHQ9 QUESTIONS 1 & 2: 0

## 2024-07-03 ASSESSMENT — PAIN SCALES - GENERAL: PAINLEVEL: 0-NO PAIN

## 2024-07-03 NOTE — PROGRESS NOTES
"Annual-menopause  Subjective   Penelope Crouch \"Liz\" is a 58 y.o. , last gyn exam 10/2018, who is here for a routine exam.   Complaints:   denies vag bleed or discharge; denies pelvic  pain, pressure, or persistent bloating.  Intermittent hot flashes/sleep disruption. Mild vag dryness.  PMHx: BMI 42. High cholesterol; high bs  Surg Hx: ABLATION 08/2014  Father: alive, cancer prostate, high chol; Mother: alive, cancer breast, high chol  Last pap  10/22/2018-NEG,HPV-NEG  Mamm 11/06/2023 NEG; Abn Mamm 8/2009 - LT. BREAST Dx = ACCESSORY BREAST TISSUE W/IN AXILLARY TAIL OF LT. BREAST    Pelvic US: 06/05/2014 endometrium 17mm.   Birth control vasectomy.   Menarche [age]. LMP= 2014/ablation.   STDs none. currently sexually active;same long term partner; denies exposure concerns..   Other 2018 fsh =34 at [facility].   Total preg  7. live births  4. SAB  3 - 1993 W/ D&C.   NVD  4 - 1988 - M, 1991-F, 1995-F, 1997-M.   Review of Systems   Constitutional:  Negative for activity change, fatigue and unexpected weight change.   Respiratory:  Negative for chest tightness and shortness of breath.    Cardiovascular:  Negative for chest pain and leg swelling.   Gastrointestinal:  Negative for abdominal distention and abdominal pain.   Genitourinary:  Negative for difficulty urinating, dysuria, genital sores, pelvic pain, vaginal bleeding, vaginal discharge and vaginal pain.   Musculoskeletal:  Positive for arthralgias and back pain. Negative for gait problem and joint swelling.   Skin:  Negative for color change and rash.   Neurological:  Negative for dizziness, weakness and headaches.   Hematological:  Negative for adenopathy.   Objective Visit Vitals  /88   Ht 1.6 m (5' 3\")   Wt 109 kg (240 lb 12.8 oz)   BMI 42.66 kg/m²   OB Status Ablation   Smoking Status Never   BSA 2.2 m²       General:   Alert and oriented, in no acute distress   Neck: Supple. No visible thyromegaly.    Breast/Axilla: Normal to palpation bilaterally without " masses, skin changes, or nipple discharge.    Abdomen: Soft, non-tender, without masses or organomegaly   Vulva: Normal architecture without erythema, masses, or lesions.    Vagina: Normal mucosa without lesions, masses.  No abnormal vaginal discharge.    Cervix: Normal without masses, lesions, or signs of cervicitis. Pap sent   Uterus: Normal mobile, non-enlarged uterus ; exam limited by bmi   Adnexa: No palpable  masses or tenderness; exam limited by bmi   Pelvic Floor No POP noted. No high tone pelvic floor    Psych  Rectal Normal affect. Normal mood.      Assessment/Plan   Encounter Diagnoses   Name Primary?    Visit for gynecologic examination; grossly nl breast exam; gyne xam grossly nl but limited by bmi. Yes    Screening for human papillomavirus; pap sent.     Encounter for screening mammogram for malignant neoplasm of breast; order placed.     Menopause; mild intermittent sxs; pt not currently wanting rxs.     Postmenopausal atrophic vaginitis; gave info sheet on otc txs.     Screen for colon cancer     BMI 40.0-44.9, adult (Multi)       Estela Gutierrez MD

## 2024-07-03 NOTE — LETTER
July 3, 2024     Patient: Penelope Crouch   YOB: 1965   Date of Visit: 7/3/2024       To Whom It May Concern:    Penelope Crouch was seen in my clinic on 7/3/2024 at 3:00 pm. Please excuse Penelope for her absence from work on this day to make the appointment.    If you have any questions or concerns, please don't hesitate to call.         Sincerely,         Estela Gutierrez MD        CC: No Recipients

## 2024-07-09 ENCOUNTER — TELEPHONE (OUTPATIENT)
Dept: PRIMARY CARE | Facility: CLINIC | Age: 59
End: 2024-07-09
Payer: COMMERCIAL

## 2024-07-09 DIAGNOSIS — E66.01 CLASS 3 SEVERE OBESITY DUE TO EXCESS CALORIES WITH SERIOUS COMORBIDITY AND BODY MASS INDEX (BMI) OF 40.0 TO 44.9 IN ADULT (MULTI): ICD-10-CM

## 2024-07-09 DIAGNOSIS — E88.810 METABOLIC SYNDROME: Primary | ICD-10-CM

## 2024-07-17 RX ORDER — TIRZEPATIDE 2.5 MG/.5ML
2.5 INJECTION, SOLUTION SUBCUTANEOUS
Qty: 2 ML | Refills: 2 | Status: SHIPPED | OUTPATIENT
Start: 2024-07-21

## 2024-07-25 ENCOUNTER — OFFICE VISIT (OUTPATIENT)
Dept: CARDIOLOGY | Facility: CLINIC | Age: 59
End: 2024-07-25
Payer: COMMERCIAL

## 2024-07-25 VITALS
DIASTOLIC BLOOD PRESSURE: 98 MMHG | OXYGEN SATURATION: 98 % | HEART RATE: 72 BPM | HEIGHT: 63 IN | SYSTOLIC BLOOD PRESSURE: 174 MMHG | BODY MASS INDEX: 42.88 KG/M2 | RESPIRATION RATE: 16 BRPM | WEIGHT: 242 LBS

## 2024-07-25 DIAGNOSIS — E78.5 HYPERLIPIDEMIA, UNSPECIFIED HYPERLIPIDEMIA TYPE: ICD-10-CM

## 2024-07-25 DIAGNOSIS — I25.10 CORONARY ARTERY DISEASE, UNSPECIFIED VESSEL OR LESION TYPE, UNSPECIFIED WHETHER ANGINA PRESENT, UNSPECIFIED WHETHER NATIVE OR TRANSPLANTED HEART: ICD-10-CM

## 2024-07-25 PROCEDURE — 93005 ELECTROCARDIOGRAM TRACING: CPT | Performed by: INTERNAL MEDICINE

## 2024-07-25 PROCEDURE — 99214 OFFICE O/P EST MOD 30 MIN: CPT | Mod: 25 | Performed by: INTERNAL MEDICINE

## 2024-07-25 PROCEDURE — 3008F BODY MASS INDEX DOCD: CPT | Performed by: INTERNAL MEDICINE

## 2024-07-25 PROCEDURE — 93010 ELECTROCARDIOGRAM REPORT: CPT | Performed by: INTERNAL MEDICINE

## 2024-07-25 PROCEDURE — 99204 OFFICE O/P NEW MOD 45 MIN: CPT | Performed by: INTERNAL MEDICINE

## 2024-07-25 RX ORDER — NAPROXEN SODIUM 220 MG/1
81 TABLET, FILM COATED ORAL DAILY
Qty: 90 TABLET | Refills: 3 | Status: SHIPPED | OUTPATIENT
Start: 2024-07-25 | End: 2025-07-25

## 2024-07-25 RX ORDER — ROSUVASTATIN CALCIUM 20 MG/1
20 TABLET, COATED ORAL DAILY
Qty: 90 TABLET | Refills: 3 | Status: SHIPPED | OUTPATIENT
Start: 2024-07-25 | End: 2025-07-25

## 2024-07-25 ASSESSMENT — PATIENT HEALTH QUESTIONNAIRE - PHQ9
2. FEELING DOWN, DEPRESSED OR HOPELESS: NOT AT ALL
1. LITTLE INTEREST OR PLEASURE IN DOING THINGS: NOT AT ALL
SUM OF ALL RESPONSES TO PHQ9 QUESTIONS 1 AND 2: 0

## 2024-07-26 ENCOUNTER — TELEPHONE (OUTPATIENT)
Dept: PRIMARY CARE | Facility: CLINIC | Age: 59
End: 2024-07-26
Payer: COMMERCIAL

## 2024-07-26 DIAGNOSIS — E11.65 TYPE 2 DIABETES MELLITUS WITH HYPERGLYCEMIA, WITHOUT LONG-TERM CURRENT USE OF INSULIN (MULTI): Primary | ICD-10-CM

## 2024-07-26 RX ORDER — TIRZEPATIDE 5 MG/.5ML
5 INJECTION, SOLUTION SUBCUTANEOUS
Qty: 2 ML | Refills: 3 | Status: SHIPPED | OUTPATIENT
Start: 2024-07-28

## 2024-07-26 NOTE — TELEPHONE ENCOUNTER
I stopped in the pharmacy to  my cholesterol medicine and they told me they actually have the 5.0 Mounjaro in stock.   I have been on the low dose for months and in order for it to work as it should I really need to try the 5.0.     Would you please consider sending a prescription to my pharmacy. They said to act fast because they won’t have the 5.0 for long. Don’t know if a 90 day would be best but that’s up to you.    Thank you for your consideration.       Mychart message from the patient     Last OV telev 4/3/24

## 2024-07-28 LAB
ATRIAL RATE: 73 BPM
P AXIS: 34 DEGREES
P OFFSET: 217 MS
P ONSET: 163 MS
PR INTERVAL: 126 MS
Q ONSET: 226 MS
QRS COUNT: 12 BEATS
QRS DURATION: 82 MS
QT INTERVAL: 406 MS
QTC CALCULATION(BAZETT): 447 MS
QTC FREDERICIA: 433 MS
R AXIS: 23 DEGREES
T AXIS: 52 DEGREES
T OFFSET: 429 MS
VENTRICULAR RATE: 73 BPM

## 2024-07-28 NOTE — PROGRESS NOTES
Primary Care Physician: Scott Chaudhary DO  Date of Visit: 2024  1:15 PM EDT  Location of visit: [address]     Chief Complaint:   Chief Complaint   Patient presents with    Establish Care     ELEVATED CT CA SCORE    Referral     From Dr Chaudhary     HPI / Summary:   Penelope Crouch is a 58 y.o. female presents for new visit  ROS    Medical History:   She has a past medical history of Abnormal mammogram (2023), Displaced fracture of lateral end of left clavicle, initial encounter for closed fracture, DM (diabetes mellitus) (Multi), High cholesterol, Urinary tract infection (Since childhood), and Varicella ([place]).  Surgical Hx:   She has a past surgical history that includes Laser ablation (2014) and D&C first trimester / Tx incomplete / missed / septic / induced .   Social Hx:   She reports that she has never smoked. She has never been exposed to tobacco smoke. She has never used smokeless tobacco. She reports that she does not currently use alcohol. She reports that she does not use drugs.  Family Hx:   Her family history includes Anemia in her father; Arrhythmia in her father; Breast cancer (age of onset: 63) in her mother; Cancer in her father and mother; Diabetes in her son; Heart failure in her father; Hyperlipidemia in her father, mother, and sibling; Kidney disease in her mother; No Known Problems in her brother, daughter, daughter, sister, and son; Prostate cancer in her father; Stroke in her father.   Allergies:  Allergies   Allergen Reactions    Sulfa (Sulfonamide Antibiotics) Anaphylaxis and Hives     hives    Ciprofloxacin Itching and Rash     Outpatient Medications:  Current Outpatient Medications   Medication Instructions    ASCORBIC ACID, VITAMIN C, ORAL 1 tablet, oral, Daily RT    aspirin 81 mg, oral, Daily    cholecalciferol (VITAMIN D-3) 5,000 Units, oral, Daily    cranberry 400 mg capsule 1 capsule, oral, Daily RT    cyanocobalamin (VITAMIN B-12) 1,000 mcg, oral, Daily RT  "   Mounjaro 5 mg, subcutaneous, Once Weekly    multivitamin with minerals iron-free (Multivitamin 50 Plus) 1 tablet, oral, Daily RT    rosuvastatin (CRESTOR) 20 mg, oral, Daily     Physical Exam:  Vitals:    07/25/24 1320 07/25/24 1322 07/25/24 1340   BP:  (!) 142/92 (!) 174/98   BP Location:  Left arm    Pulse:  75 72   Resp:  16    SpO2:  98%    Weight: 110 kg (242 lb)     Height: 1.6 m (5' 3\")       Wt Readings from Last 5 Encounters:   07/25/24 110 kg (242 lb)   07/03/24 109 kg (240 lb 12.8 oz)   06/03/24 110 kg (242 lb 8.1 oz)   03/19/24 110 kg (243 lb 9.6 oz)   11/06/23 108 kg (238 lb)     Physical Exam  JVP not elevated. Carotid impulses are 2+ without overlying bruit.   Chest exhibits fair to good air movement with completely clear breath sounds.   The cardiac rhythm is regular with no premature beats.   Normal S1 and S2. No gallop, murmur or rub, or click.   Abdomen is soft and benign without focal tenderness.   With no lower leg edema. The pedal pulses are intact.     Last Labs:  Office Visit on 07/03/2024   Component Date Value    Case Report 07/03/2024                      Value:Gynecologic Cytology                              Case: [id number]                                   Authorizing Provider:  Estela Gutierrez MD        Collected:           07/03/2024 [facility]              Ordering Location:     [facility]       Received:            07/03/2024 [address] [place] [address]                                                                First Screen:          BILLY Loza                                                              Specimen:    ThinPrep Liquid-Based Pap-Imaging System Screen, ECTO/ENDOCERVIX/VAGINA, SCREENING         Final Cytological Interp* 07/03/2024                      Value:    A. THINPREP PAP ECTO/ENDOCERVIX/VAGINA, SCREENING -     Specimen Adequacy  Satisfactory for evaluation; absence of endocervical/transformation zone component    General " Categorization  Negative for intraepithelial lesion or malignancy.    Descriptive Interpretation  Negative for intraepithelial lesion or malignancy              07/03/2024                      Value:Slide(s) initially screened by BILLY Tavares at [facility] [address] [facility] [id number]  By the signature on this report, the individual or group listed as making the Final Interpretation/Diagnosis certifies that they have reviewed this case.       ThinPrep Imaging System 07/03/2024                      Value:This specimen has been analyzed by the ThinPrep Imaging System ([employer] an automated imaging and review system, which assists the laboratory in evaluating cells on ThinPrep Pap tests. Following automated imaging, selected fields from every slide were reviewed by a cytotechnologist and/or pathologist.        Educational Note 07/03/2024                      Value:Cervical cytology is a screening procedure primarily for squamous cancers and precursors and has associated false-negative and false-positives results as evidenced by published data. Your patient's test should be interpreted in this context, together with the patient's history and clinical findings. Regular sampling and follow-up of unexplained clinical signs and symptoms are recommended to minimize false negative results.      Perform HPV HR test? 07/03/2024 Always (all interpretations)     Include HPV Genotype? 07/03/2024 Yes     Menstrual status 07/03/2024                      Value:Post-menopausal      HPV, high-risk 07/03/2024 Negative     HPV Type 16 DNA 07/03/2024 Negative     HPV Type 18 DNA 07/03/2024 Negative     HPV non-Type 16 or 18 DNA 07/03/2024 Negative    Lab on 05/06/2024   Component Date Value    Thyroid Stimulating Horm* 05/06/2024 1.85    Lab on 03/19/2024   Component Date Value    Glucose 03/19/2024 96     Sodium 03/19/2024 139     Potassium 03/19/2024 4.0     Chloride 03/19/2024 102     Bicarbonate  03/19/2024 28     Anion Gap 03/19/2024 13     Urea Nitrogen 03/19/2024 14     Creatinine 03/19/2024 0.74     eGFR 03/19/2024 >90     Calcium 03/19/2024 10.2     Albumin 03/19/2024 4.6     Alkaline Phosphatase 03/19/2024 97     Total Protein 03/19/2024 7.6     AST 03/19/2024 19     Bilirubin, Total 03/19/2024 0.4     ALT 03/19/2024 18     Thyroid Stimulating Horm* 03/19/2024 3.56     Cholesterol 03/19/2024 191     HDL-Cholesterol 03/19/2024 55.1     Cholesterol/HDL Ratio 03/19/2024 3.5     LDL Calculated 03/19/2024 75     VLDL 03/19/2024 61 (H)     Triglycerides 03/19/2024 305 (H)     Non HDL Cholesterol 03/19/2024 136     WBC 03/19/2024 8.0     nRBC 03/19/2024 0.0     RBC 03/19/2024 5.07     Hemoglobin 03/19/2024 14.2     Hematocrit 03/19/2024 44.9     MCV 03/19/2024 89     MCH 03/19/2024 28.0     MCHC 03/19/2024 31.6 (L)     RDW 03/19/2024 13.6     Platelets 03/19/2024 310     Neutrophils % 03/19/2024 51.3     Immature Granulocytes %,* 03/19/2024 0.4     Lymphocytes % 03/19/2024 33.0     Monocytes % 03/19/2024 6.4     Eosinophils % 03/19/2024 7.7     Basophils % 03/19/2024 1.2     Neutrophils Absolute 03/19/2024 4.11     Immature Granulocytes Ab* 03/19/2024 0.03     Lymphocytes Absolute 03/19/2024 2.65     Monocytes Absolute 03/19/2024 0.51     Eosinophils Absolute 03/19/2024 0.62     Basophils Absolute 03/19/2024 0.10     Vitamin B12 03/19/2024 1,253 (H)     Hemoglobin A1C 03/19/2024 5.6     Estimated Average Glucose 03/19/2024 114     Vitamin D, 25-Hydroxy, T* 03/19/2024 47         Assessment/Plan   1.  Hypertension.  Patient previously not diagnosed as being hypertensive.  Blood pressure during first office visit 174/98.  Will reassess blood pressure at time of return visit in 3 months for stress echo.  Anticipate therapy may be necessary especially with an ARB.  2.  Borderline type 2 diabetes.  The patient has been prescribed Mounjaro for weight loss which she has taken for 6 months.  There is been some  difficulty obtaining her next dosage and she is still in the process of being uptitrated.  3.  Hyperlipidemia.  The patient has been on simvastatin 20 mg daily for approximately 10 years.  Most recent lipid panel includes cholesterol 191 LDL 75 HDL 55 triglyceride 305.  Will change simvastatin 20 mg daily to rosuvastatin 20 mg daily.  4.  Coronary artery disease.  The patient did have a CT coronary calcium score of 279 performed 5/30/2024.  She is asymptomatic no chest pain rare palpitation.  Baseline EKG today shows sinus rhythm and is unremarkable.  She will be scheduled for a stress echocardiogram in 3 months.  5.  Non-smoker.  6.  Status post D&C for miscarriage.  7.  Bilateral knee DJD.            Orders:  Orders Placed This Encounter   Procedures    ECG 12 lead (Clinic Performed)      Followup Appts:  Future Appointments   Date Time Provider Department Center   10/14/2024  9:30 AM Scott Chaudhary DO [id number] [facility]   11/14/2024 12:45 PM EDGARDO MENTOR MAMMRAGHU REYNOSOMnHumberto [place] [facility]   11/22/2024 10:30 AM MENT ECHO/STRESS [id number] [facility] [place] [facility]           ____________________________________________________________  Matthew Wang MD  [facility]  Assistant Clinical Professor, [employer]  [facility]

## 2024-10-14 ENCOUNTER — OFFICE VISIT (OUTPATIENT)
Dept: PRIMARY CARE | Facility: CLINIC | Age: 59
End: 2024-10-14
Payer: COMMERCIAL

## 2024-10-14 ENCOUNTER — LAB (OUTPATIENT)
Dept: LAB | Facility: LAB | Age: 59
End: 2024-10-14

## 2024-10-14 VITALS
TEMPERATURE: 97.9 F | HEART RATE: 72 BPM | BODY MASS INDEX: 41.96 KG/M2 | OXYGEN SATURATION: 97 % | DIASTOLIC BLOOD PRESSURE: 70 MMHG | HEIGHT: 63 IN | WEIGHT: 236.8 LBS | SYSTOLIC BLOOD PRESSURE: 110 MMHG

## 2024-10-14 DIAGNOSIS — E78.2 MIXED HYPERLIPIDEMIA: ICD-10-CM

## 2024-10-14 DIAGNOSIS — E55.9 VITAMIN D DEFICIENCY: ICD-10-CM

## 2024-10-14 DIAGNOSIS — E11.65 TYPE 2 DIABETES MELLITUS WITH HYPERGLYCEMIA, WITHOUT LONG-TERM CURRENT USE OF INSULIN: ICD-10-CM

## 2024-10-14 DIAGNOSIS — Z00.00 PHYSICAL EXAM: ICD-10-CM

## 2024-10-14 DIAGNOSIS — E66.813 CLASS 3 SEVERE OBESITY DUE TO EXCESS CALORIES WITHOUT SERIOUS COMORBIDITY WITH BODY MASS INDEX (BMI) OF 40.0 TO 44.9 IN ADULT: ICD-10-CM

## 2024-10-14 DIAGNOSIS — R73.9 HYPERGLYCEMIA: ICD-10-CM

## 2024-10-14 DIAGNOSIS — R53.83 TIRED: ICD-10-CM

## 2024-10-14 DIAGNOSIS — K21.9 GASTROESOPHAGEAL REFLUX DISEASE WITHOUT ESOPHAGITIS: ICD-10-CM

## 2024-10-14 DIAGNOSIS — E66.01 CLASS 3 SEVERE OBESITY DUE TO EXCESS CALORIES WITHOUT SERIOUS COMORBIDITY WITH BODY MASS INDEX (BMI) OF 40.0 TO 44.9 IN ADULT: ICD-10-CM

## 2024-10-14 DIAGNOSIS — R11.0 NAUSEA: ICD-10-CM

## 2024-10-14 DIAGNOSIS — Z00.00 PHYSICAL EXAM: Primary | ICD-10-CM

## 2024-10-14 LAB
ALBUMIN SERPL BCP-MCNC: 4.7 G/DL (ref 3.4–5)
ALP SERPL-CCNC: 90 U/L (ref 33–110)
ALT SERPL W P-5'-P-CCNC: 16 U/L (ref 7–45)
ANION GAP SERPL CALC-SCNC: <7 MMOL/L (ref 10–20)
AST SERPL W P-5'-P-CCNC: 16 U/L (ref 9–39)
BASOPHILS # BLD AUTO: 0.08 X10*3/UL (ref 0–0.1)
BASOPHILS NFR BLD AUTO: 0.9 %
BILIRUB SERPL-MCNC: 0.4 MG/DL (ref 0–1.2)
BUN SERPL-MCNC: 21 MG/DL (ref 6–23)
CALCIUM SERPL-MCNC: 10.1 MG/DL (ref 8.6–10.3)
CHLORIDE SERPL-SCNC: 105 MMOL/L (ref 98–107)
CHOLEST SERPL-MCNC: 147 MG/DL (ref 0–199)
CHOLESTEROL/HDL RATIO: 3.1
CO2 SERPL-SCNC: 30 MMOL/L (ref 21–32)
CREAT SERPL-MCNC: 0.81 MG/DL (ref 0.5–1.05)
EGFRCR SERPLBLD CKD-EPI 2021: 85 ML/MIN/1.73M*2
EOSINOPHIL # BLD AUTO: 0.54 X10*3/UL (ref 0–0.7)
EOSINOPHIL NFR BLD AUTO: 6.3 %
ERYTHROCYTE [DISTWIDTH] IN BLOOD BY AUTOMATED COUNT: 13.8 % (ref 11.5–14.5)
GLUCOSE SERPL-MCNC: 98 MG/DL (ref 74–99)
HCT VFR BLD AUTO: 46.3 % (ref 36–46)
HDLC SERPL-MCNC: 47.3 MG/DL
HGB BLD-MCNC: 14.7 G/DL (ref 12–16)
IMM GRANULOCYTES # BLD AUTO: 0.02 X10*3/UL (ref 0–0.7)
IMM GRANULOCYTES NFR BLD AUTO: 0.2 % (ref 0–0.9)
LDLC SERPL CALC-MCNC: 55 MG/DL
LYMPHOCYTES # BLD AUTO: 2.82 X10*3/UL (ref 1.2–4.8)
LYMPHOCYTES NFR BLD AUTO: 33.1 %
MCH RBC QN AUTO: 28.3 PG (ref 26–34)
MCHC RBC AUTO-ENTMCNC: 31.7 G/DL (ref 32–36)
MCV RBC AUTO: 89 FL (ref 80–100)
MONOCYTES # BLD AUTO: 0.59 X10*3/UL (ref 0.1–1)
MONOCYTES NFR BLD AUTO: 6.9 %
NEUTROPHILS # BLD AUTO: 4.47 X10*3/UL (ref 1.2–7.7)
NEUTROPHILS NFR BLD AUTO: 52.6 %
NON HDL CHOLESTEROL: 100 MG/DL (ref 0–149)
NRBC BLD-RTO: 0 /100 WBCS (ref 0–0)
PLATELET # BLD AUTO: 273 X10*3/UL (ref 150–450)
POTASSIUM SERPL-SCNC: 3.9 MMOL/L (ref 3.5–5.3)
PROT SERPL-MCNC: 7.9 G/DL (ref 6.4–8.2)
RBC # BLD AUTO: 5.2 X10*6/UL (ref 4–5.2)
SODIUM SERPL-SCNC: 137 MMOL/L (ref 136–145)
TRIGL SERPL-MCNC: 222 MG/DL (ref 0–149)
TSH SERPL-ACNC: 1.84 MIU/L (ref 0.44–3.98)
VLDL: 44 MG/DL (ref 0–40)
WBC # BLD AUTO: 8.5 X10*3/UL (ref 4.4–11.3)

## 2024-10-14 PROCEDURE — 36415 COLL VENOUS BLD VENIPUNCTURE: CPT

## 2024-10-14 PROCEDURE — 82570 ASSAY OF URINE CREATININE: CPT

## 2024-10-14 PROCEDURE — 83036 HEMOGLOBIN GLYCOSYLATED A1C: CPT

## 2024-10-14 PROCEDURE — 82607 VITAMIN B-12: CPT

## 2024-10-14 PROCEDURE — 82043 UR ALBUMIN QUANTITATIVE: CPT

## 2024-10-14 PROCEDURE — 82306 VITAMIN D 25 HYDROXY: CPT

## 2024-10-14 RX ORDER — PHENTERMINE HYDROCHLORIDE 37.5 MG/1
1 TABLET ORAL
COMMUNITY
Start: 2023-11-17 | End: 2024-10-14 | Stop reason: WASHOUT

## 2024-10-14 RX ORDER — OMEPRAZOLE 20 MG/1
20 TABLET, DELAYED RELEASE ORAL
COMMUNITY
End: 2024-10-14 | Stop reason: WASHOUT

## 2024-10-14 RX ORDER — ONDANSETRON 4 MG/1
4 TABLET, ORALLY DISINTEGRATING ORAL EVERY 8 HOURS PRN
COMMUNITY
Start: 2024-04-25 | End: 2024-10-14 | Stop reason: WASHOUT

## 2024-10-14 RX ORDER — IBUPROFEN 200 MG
200 TABLET ORAL EVERY 6 HOURS PRN
COMMUNITY
End: 2024-10-14 | Stop reason: WASHOUT

## 2024-10-14 RX ORDER — PHENTERMINE HYDROCHLORIDE 37.5 MG/1
37.5 TABLET ORAL
Qty: 30 TABLET | Refills: 0 | Status: SHIPPED | OUTPATIENT
Start: 2024-10-14 | End: 2024-11-13

## 2024-10-14 RX ORDER — ONDANSETRON 4 MG/1
4 TABLET, ORALLY DISINTEGRATING ORAL EVERY 8 HOURS PRN
COMMUNITY
Start: 2024-07-01 | End: 2024-10-14 | Stop reason: SDUPTHER

## 2024-10-14 RX ORDER — NITROFURANTOIN 25; 75 MG/1; MG/1
1 CAPSULE ORAL
COMMUNITY
Start: 2024-02-21 | End: 2024-10-14 | Stop reason: WASHOUT

## 2024-10-14 RX ORDER — ONDANSETRON 4 MG/1
4 TABLET, ORALLY DISINTEGRATING ORAL EVERY 8 HOURS PRN
Qty: 30 TABLET | Refills: 3 | Status: SHIPPED | OUTPATIENT
Start: 2024-10-14

## 2024-10-14 RX ORDER — OMEPRAZOLE 40 MG/1
40 CAPSULE, DELAYED RELEASE ORAL DAILY PRN
Qty: 30 CAPSULE | Refills: 5 | Status: SHIPPED | OUTPATIENT
Start: 2024-10-14

## 2024-10-14 RX ORDER — TIRZEPATIDE 7.5 MG/.5ML
7.5 INJECTION, SOLUTION SUBCUTANEOUS WEEKLY
Qty: 6 ML | Refills: 1 | Status: SHIPPED | OUTPATIENT
Start: 2024-10-14

## 2024-10-14 ASSESSMENT — ENCOUNTER SYMPTOMS
ENDOCRINE NEGATIVE: 1
SHORTNESS OF BREATH: 0
DIZZINESS: 0
NAUSEA: 0
DIARRHEA: 0
DIFFICULTY URINATING: 0
FEVER: 0

## 2024-10-14 ASSESSMENT — PROMIS GLOBAL HEALTH SCALE
EMOTIONAL_PROBLEMS: SOMETIMES
RATE_PHYSICAL_HEALTH: GOOD
CARRYOUT_SOCIAL_ACTIVITIES: GOOD
RATE_QUALITY_OF_LIFE: GOOD
CARRYOUT_PHYSICAL_ACTIVITIES: MODERATELY
RATE_SOCIAL_SATISFACTION: GOOD
RATE_GENERAL_HEALTH: GOOD
RATE_AVERAGE_FATIGUE: MODERATE
RATE_AVERAGE_PAIN: 3
RATE_MENTAL_HEALTH: GOOD

## 2024-10-14 ASSESSMENT — PAIN SCALES - GENERAL: PAINLEVEL: 0-NO PAIN

## 2024-10-14 ASSESSMENT — PATIENT HEALTH QUESTIONNAIRE - PHQ9
SUM OF ALL RESPONSES TO PHQ9 QUESTIONS 1 AND 2: 0
2. FEELING DOWN, DEPRESSED OR HOPELESS: NOT AT ALL
1. LITTLE INTEREST OR PLEASURE IN DOING THINGS: NOT AT ALL

## 2024-10-14 NOTE — PROGRESS NOTES
"Subjective   Patient ID: Liz Crouch is a 58 y.o. female who presents for Annual Exam.    Physical exam  History of hyperlipidemia  Morbid obesity  Tired  Trouble losing weight  History of diabetes mellitus   Nausea with using Mounjaro  Vitamin D deficiency           Review of Systems   Constitutional:  Negative for fever.        Also see HPI   Eyes:  Negative for visual disturbance.   Respiratory:  Negative for shortness of breath.    Cardiovascular:  Negative for chest pain.   Gastrointestinal:  Negative for diarrhea and nausea.   Endocrine: Negative.    Genitourinary:  Negative for difficulty urinating.   Skin:  Negative for rash.   Neurological:  Negative for dizziness.        No focal deficits   Psychiatric/Behavioral:  Negative for suicidal ideas.    All other systems reviewed and are negative.      Objective   /70   Pulse 72   Temp 36.6 °C (97.9 °F)   Ht 1.6 m (5' 3\")   Wt 107 kg (236 lb 12.8 oz)   LMP  (LMP Unknown)   SpO2 97%   BMI 41.95 kg/m²     Physical Exam  Vitals and nursing note reviewed.   Constitutional:       Appearance: Normal appearance.   HENT:      Head: Normocephalic and atraumatic.      Right Ear: Tympanic membrane, ear canal and external ear normal.      Left Ear: Tympanic membrane, ear canal and external ear normal.      Nose: Nose normal.      Mouth/Throat:      Mouth: Mucous membranes are moist.      Pharynx: Oropharynx is clear.   Eyes:      Extraocular Movements: Extraocular movements intact.      Conjunctiva/sclera: Conjunctivae normal.      Pupils: Pupils are equal, round, and reactive to light.   Cardiovascular:      Rate and Rhythm: Normal rate and regular rhythm.      Heart sounds: Normal heart sounds.   Pulmonary:      Effort: No respiratory distress.      Breath sounds: Normal breath sounds.   Abdominal:      General: Bowel sounds are normal. There is no distension.      Palpations: Abdomen is soft. There is no mass.      Tenderness: There is no abdominal " tenderness.   Musculoskeletal:      Cervical back: Normal range of motion and neck supple.      Right lower leg: No edema.      Left lower leg: No edema.   Skin:     Coloration: Skin is not jaundiced.      Findings: No rash.   Neurological:      General: No focal deficit present.      Mental Status: She is alert and oriented to person, place, and time.   Psychiatric:         Mood and Affect: Mood normal.         Speech: Speech normal.         Behavior: Behavior normal.         Thought Content: Thought content normal.         Judgment: Judgment normal.         Assessment/Plan   Diagnoses and all orders for this visit:  Physical exam  -     CBC and Auto Differential; Future  -     Comprehensive Metabolic Panel; Future  -     Lipid Panel; Future  Vitamin D deficiency  -     Vitamin D 25-Hydroxy,Total (for eval of Vitamin D levels); Future  Hyperglycemia  -     Hemoglobin A1C; Future  Gastroesophageal reflux disease without esophagitis  Class 3 severe obesity due to excess calories without serious comorbidity with body mass index (BMI) of 40.0 to 44.9 in adult  -     phentermine (Adipex-P) 37.5 mg tablet; Take 1 tablet (37.5 mg) by mouth once daily in the morning. Take before meals.  -     Follow Up In Primary Care - Established; Future  Nausea  -     ondansetron ODT (Zofran-ODT) 4 mg disintegrating tablet; Take 1 tablet (4 mg) by mouth every 8 hours if needed for nausea or vomiting. Dissolve on tongue  Tired  -     CBC and Auto Differential; Future  -     Comprehensive Metabolic Panel; Future  -     TSH with reflex to Free T4 if abnormal; Future  -     Vitamin B12; Future  -     Follow Up In Primary Care - Established; Future  Mixed hyperlipidemia  -     CBC and Auto Differential; Future  -     Comprehensive Metabolic Panel; Future  -     TSH with reflex to Free T4 if abnormal; Future  -     Lipid Panel; Future  Type 2 diabetes mellitus with hyperglycemia, without long-term current use of insulin  -     tirzepatide  (Mounjaro) 7.5 mg/0.5 mL pen injector; Inject 7.5 mg under the skin 1 (one) time per week.  -     Albumin-Creatinine Ratio, Urine Random; Future

## 2024-10-15 LAB
25(OH)D3 SERPL-MCNC: 49 NG/ML (ref 30–100)
CREAT UR-MCNC: 156.4 MG/DL (ref 20–320)
EST. AVERAGE GLUCOSE BLD GHB EST-MCNC: 105 MG/DL
HBA1C MFR BLD: 5.3 %
MICROALBUMIN UR-MCNC: 7.4 MG/L
MICROALBUMIN/CREAT UR: 4.7 UG/MG CREAT
VIT B12 SERPL-MCNC: 825 PG/ML (ref 211–911)

## 2024-11-14 ENCOUNTER — APPOINTMENT (OUTPATIENT)
Dept: RADIOLOGY | Facility: CLINIC | Age: 59
End: 2024-11-14
Payer: COMMERCIAL

## 2024-11-14 ENCOUNTER — HOSPITAL ENCOUNTER (OUTPATIENT)
Dept: RADIOLOGY | Facility: CLINIC | Age: 59
Discharge: HOME | End: 2024-11-14
Payer: COMMERCIAL

## 2024-11-14 VITALS — HEIGHT: 63 IN | WEIGHT: 233 LBS | BODY MASS INDEX: 41.29 KG/M2

## 2024-11-14 DIAGNOSIS — Z12.31 ENCOUNTER FOR SCREENING MAMMOGRAM FOR MALIGNANT NEOPLASM OF BREAST: ICD-10-CM

## 2024-11-14 PROCEDURE — 77067 SCR MAMMO BI INCL CAD: CPT

## 2024-11-18 ENCOUNTER — APPOINTMENT (OUTPATIENT)
Dept: PRIMARY CARE | Facility: CLINIC | Age: 59
End: 2024-11-18
Payer: COMMERCIAL

## 2024-11-22 ENCOUNTER — HOSPITAL ENCOUNTER (OUTPATIENT)
Dept: CARDIOLOGY | Facility: CLINIC | Age: 59
Discharge: HOME | End: 2024-11-22
Payer: COMMERCIAL

## 2024-11-22 DIAGNOSIS — I25.10 CORONARY ARTERY DISEASE, UNSPECIFIED VESSEL OR LESION TYPE, UNSPECIFIED WHETHER ANGINA PRESENT, UNSPECIFIED WHETHER NATIVE OR TRANSPLANTED HEART: ICD-10-CM

## 2024-11-22 PROCEDURE — 93350 STRESS TTE ONLY: CPT | Performed by: INTERNAL MEDICINE

## 2024-11-22 PROCEDURE — 93016 CV STRESS TEST SUPVJ ONLY: CPT | Performed by: INTERNAL MEDICINE

## 2024-11-22 PROCEDURE — 93018 CV STRESS TEST I&R ONLY: CPT | Performed by: INTERNAL MEDICINE

## 2024-11-22 PROCEDURE — 93017 CV STRESS TEST TRACING ONLY: CPT

## 2024-12-09 ENCOUNTER — OFFICE VISIT (OUTPATIENT)
Dept: OPHTHALMOLOGY | Facility: CLINIC | Age: 59
End: 2024-12-09
Payer: COMMERCIAL

## 2024-12-09 DIAGNOSIS — H01.02B SQUAMOUS BLEPHARITIS OF UPPER AND LOWER EYELIDS OF BOTH EYES: ICD-10-CM

## 2024-12-09 DIAGNOSIS — E88.810 METABOLIC SYNDROME: ICD-10-CM

## 2024-12-09 DIAGNOSIS — H01.02A SQUAMOUS BLEPHARITIS OF UPPER AND LOWER EYELIDS OF BOTH EYES: ICD-10-CM

## 2024-12-09 DIAGNOSIS — H25.813 COMBINED FORMS OF AGE-RELATED CATARACT OF BOTH EYES: Primary | ICD-10-CM

## 2024-12-09 DIAGNOSIS — R73.9 HYPERGLYCEMIA: ICD-10-CM

## 2024-12-09 DIAGNOSIS — H52.7 REFRACTIVE ERROR: ICD-10-CM

## 2024-12-09 PROCEDURE — 99204 OFFICE O/P NEW MOD 45 MIN: CPT | Performed by: OPHTHALMOLOGY

## 2024-12-09 PROCEDURE — 99214 OFFICE O/P EST MOD 30 MIN: CPT | Performed by: OPHTHALMOLOGY

## 2024-12-09 PROCEDURE — 92015 DETERMINE REFRACTIVE STATE: CPT | Performed by: OPHTHALMOLOGY

## 2024-12-09 ASSESSMENT — ENCOUNTER SYMPTOMS
ENDOCRINE NEGATIVE: 0
HEMATOLOGIC/LYMPHATIC NEGATIVE: 0
GASTROINTESTINAL NEGATIVE: 0
EYES NEGATIVE: 0
CARDIOVASCULAR NEGATIVE: 0
RESPIRATORY NEGATIVE: 0
NEUROLOGICAL NEGATIVE: 0
PSYCHIATRIC NEGATIVE: 0
ALLERGIC/IMMUNOLOGIC NEGATIVE: 0
CONSTITUTIONAL NEGATIVE: 0
MUSCULOSKELETAL NEGATIVE: 0

## 2024-12-09 ASSESSMENT — REFRACTION_WEARINGRX
OD_SPHERE: +1.75
OD_AXIS: 132
OS_SPHERE: +1.75
OD_CYLINDER: -0.25
SPECS_TYPE: READERS
OS_AXIS: 097
OS_CYLINDER: -0.50

## 2024-12-09 ASSESSMENT — KERATOMETRY
OS_K2POWER_DIOPTERS: 43.25
OS_AXISANGLE_DEGREES: 60
OD_AXISANGLE2_DEGREES: 175
OS_K1POWER_DIOPTERS: 42.25
OD_K2POWER_DIOPTERS: 43.00
OD_AXISANGLE_DEGREES: 85
OD_K1POWER_DIOPTERS: 42.00
OS_AXISANGLE2_DEGREES: 150

## 2024-12-09 ASSESSMENT — REFRACTION_MANIFEST
OS_SPHERE: +0.50
OD_AXIS: 155
OS_CYLINDER: -0.75
OS_SPHERE: +0.50
OS_AXIS: 105
OD_AXIS: 155
METHOD_AUTOREFRACTION: 1
OD_CYLINDER: -0.75
OD_ADD: +2.00
OS_CYLINDER: -0.75
OS_AXIS: 110
OS_ADD: +2.00
OD_SPHERE: +0.25
OD_SPHERE: +0.00
OD_CYLINDER: -0.75

## 2024-12-09 ASSESSMENT — SLIT LAMP EXAM - LIDS
COMMENTS: 1+ BLEPHARITIS
COMMENTS: 1+ BLEPHARITIS

## 2024-12-09 ASSESSMENT — PAIN SCALES - GENERAL: PAINLEVEL_OUTOF10: 0-NO PAIN

## 2024-12-09 ASSESSMENT — TONOMETRY
OD_IOP_MMHG: 13
OS_IOP_MMHG: 13
IOP_METHOD: GOLDMANN APPLANATION

## 2024-12-09 ASSESSMENT — VISUAL ACUITY
METHOD: SNELLEN - LINEAR
OS_SC: 20/20
OD_SC: 20/20
OD_SC+: -2

## 2024-12-09 ASSESSMENT — CUP TO DISC RATIO
OS_RATIO: 0.3
OD_RATIO: 0.3

## 2024-12-09 ASSESSMENT — EXTERNAL EXAM - RIGHT EYE: OD_EXAM: NORMAL

## 2024-12-09 ASSESSMENT — EXTERNAL EXAM - LEFT EYE: OS_EXAM: NORMAL

## 2024-12-09 NOTE — PROGRESS NOTES
Subjective   Patient ID: Liz Crouch is a 59 y.o. female.    Chief Complaint    New Patient Visit       HPI    DIMITRI X 1 YEAR.     Complete and pre-diabetic dilated exam.  No new changes in health history or meds.  Really only needs specs for near.  Distance vision is good.    Last edited by Salvatore Abdi MD on 12/9/2024  4:28 PM.        No current outpatient medications on file. (Ophthalmology pharm classes)       Current Outpatient Medications (Other)   Medication Sig Dispense Refill    ASCORBIC ACID, VITAMIN C, ORAL Take 1 tablet by mouth once daily.      aspirin 81 mg chewable tablet Chew 1 tablet (81 mg) once daily. 90 tablet 3    cholecalciferol (Vitamin D-3) 5,000 Units tablet Take 1 tablet (5,000 Units) by mouth once daily.      cranberry 400 mg capsule Take 1 capsule by mouth once daily.      cyanocobalamin (Vitamin B-12) 1,000 mcg tablet Take 1 tablet (1,000 mcg) by mouth once daily.      multivitamin with minerals iron-free (Multivitamin 50 Plus) Take 1 tablet by mouth once daily.      omeprazole (PriLOSEC) 40 mg DR capsule Take 1 capsule (40 mg) by mouth once daily as needed (heartburn). Do not crush or chew. 30 capsule 5    ondansetron ODT (Zofran-ODT) 4 mg disintegrating tablet Take 1 tablet (4 mg) by mouth every 8 hours if needed for nausea or vomiting. Dissolve on tongue 30 tablet 3    rosuvastatin (Crestor) 20 mg tablet Take 1 tablet (20 mg) by mouth once daily. 90 tablet 3    tirzepatide (Mounjaro) 7.5 mg/0.5 mL pen injector Inject 7.5 mg under the skin 1 (one) time per week. 6 mL 1    phentermine (Adipex-P) 37.5 mg tablet Take 1 tablet (37.5 mg) by mouth once daily in the morning. Take before meals. 30 tablet 0       Objective   Base Eye Exam       Visual Acuity (Snellen - Linear)         Right Left Both    Dist sc 20/20 -2 20/20     Near cc   J1+              Tonometry (Goldmann Applanation, 3:49 PM)         Right Left    Pressure 13 13              Pupils         Dark Light Shape React APD     Right 4 3 Round 2 None    Left 4 3 Round 2 None              Extraocular Movement         Right Left     Full Full              Dilation       Both eyes: 1% Tropic 2.5% Phen @ 3:49 PM                  Additional Tests       Keratometry         K1 Axis K2 Axis    Right 42.00 175 43.00 85    Left 42.25 150 43.25 60                  Slit Lamp and Fundus Exam       External Exam         Right Left    External Normal Normal              Slit Lamp Exam         Right Left    Lids/Lashes 1+ Blepharitis 1+ Blepharitis    Conjunctiva/Sclera White and quiet White and quiet    Cornea Clear Clear    Anterior Chamber Deep and quiet Deep and quiet    Iris Round and reactive Round and reactive    Lens Trace Nuclear sclerosis Trace Nuclear sclerosis    Anterior Vitreous Vitreous syneresis Vitreous syneresis              Fundus Exam         Right Left    Disc Normal Normal    C/D Ratio 0.3 0.3    Macula Normal Normal    Vessels Normal Normal    Periphery Normal Normal                  Refraction       Wearing Rx         Sphere Cylinder Axis    Right +1.75 -0.25 132    Left +1.75 -0.50 097      Type: READERS              Manifest Refraction (Auto)         Sphere Cylinder [place] Dist VA Add Near VA    Right +0.25 -0.75 155       Left +0.50 -0.75 110                 Manifest Refraction #2 (Subjective)         Sphere Cylinder [place] Dist VA Add Near VA    Right +0.00 -0.75 155 20/20 +2.00 20/20    Left +0.50 -0.75 105 20/20 +2.00 20/20      Pupillary Distance: 60              Final Rx         Sphere Cylinder [place] Dist VA Add Near VA    Right +0.00 -0.75 155 20/20 +2.00 20/20    Left +0.50 -0.75 105 20/20 +2.00 20/20      Expiration Date: 12/9/2026    Pupillary Distance: 60                    Assessment/Plan   Problem List Items Addressed This Visit          Eye/Vision problems    Metabolic syndrome    Hyperglycemia    Combined forms of age-related cataract of both eyes - Primary     Readers only are fine per pt.  F/u one year full.            Refractive error    Squamous blepharitis of upper and lower eyelids of both eyes

## 2024-12-09 NOTE — LETTER
"December 9, 2024    Scott Chaudhary DO  [address] [facility] [address]     Patient: Liz Crouch   YOB: 1965   Date of Visit: 12/9/2024       Dear Dr. Scott Chaudhary DO:    Thank you for referring Liz Crouch to me for evaluation. Here is my assessment and plan of care:    Assessment/Plan:  Penelope \"Liz\" was seen today for new patient visit.  Diagnoses and all orders for this visit:  Combined forms of age-related cataract of both eyes (Primary)  Hyperglycemia  Metabolic syndrome  Squamous blepharitis of upper and lower eyelids of both eyes  Refractive error    Below you will find my full exam findings. If you have questions, please do not hesitate to call me. I look forward to following Liz along with you.     Essentially normal exam.  Follow up in one year for a full exam.      Sincerely,        Salvatore Abdi MD        CC:   No Recipients        Base Eye Exam       Visual Acuity (Snellen - Linear)         Right Left Both    Dist sc 20/20 -2 20/20     Near cc   J1+              Tonometry (Goldmann Applanation, 3:49 PM)         Right Left    Pressure 13 13              Pupils         Dark Light Shape React APD    Right 4 3 Round 2 None    Left 4 3 Round 2 None              Extraocular Movement         Right Left     Full Full              Dilation       Both eyes: 1% Tropic 2.5% Phen @ 3:49 PM                  Additional Tests       Keratometry         K1 Axis K2 Axis    Right 42.00 175 43.00 85    Left 42.25 150 43.25 60                  Slit Lamp and Fundus Exam       External Exam         Right Left    External Normal Normal              Slit Lamp Exam         Right Left    Lids/Lashes 1+ Blepharitis 1+ Blepharitis    Conjunctiva/Sclera White and quiet White and quiet    Cornea Clear Clear    Anterior Chamber Deep and quiet Deep and quiet    Iris Round and reactive Round and reactive    Lens Trace Nuclear sclerosis Trace Nuclear sclerosis    Anterior Vitreous Vitreous syneresis Vitreous " syneresis              Fundus Exam         Right Left    Disc Normal Normal    C/D Ratio 0.3 0.3    Macula Normal Normal    Vessels Normal Normal    Periphery Normal Normal                  Refraction       Wearing Rx         Sphere Cylinder Axis    Right +1.75 -0.25 132    Left +1.75 -0.50 097      Type: READERS              Manifest Refraction (Auto)         Sphere Cylinder [place] Dist VA Add Near VA    Right +0.25 -0.75 155       Left +0.50 -0.75 110                 Manifest Refraction #2 (Subjective)         Sphere Cylinder [place] Dist VA Add Near VA    Right +0.00 -0.75 155 20/20 +2.00 20/20    Left +0.50 -0.75 105 20/20 +2.00 20/20      Pupillary Distance: 60              Final Rx         Sphere Cylinder [place] Dist VA Add Near VA    Right +0.00 -0.75 155 20/20 +2.00 20/20    Left +0.50 -0.75 105 20/20 +2.00 20/20      Expiration Date: 12/9/2026    Pupillary Distance: 60

## 2025-01-13 DIAGNOSIS — E78.5 HYPERLIPIDEMIA: Primary | ICD-10-CM

## 2025-01-13 RX ORDER — PRAVASTATIN SODIUM 10 MG/1
10 TABLET ORAL NIGHTLY
Qty: 30 TABLET | Refills: 1 | Status: SHIPPED | OUTPATIENT
Start: 2025-01-13 | End: 2025-03-14

## 2025-01-13 RX ORDER — PRAVASTATIN SODIUM 10 MG/1
10 TABLET ORAL NIGHTLY
COMMUNITY
End: 2025-01-13 | Stop reason: SDUPTHER

## 2025-01-27 DIAGNOSIS — E78.5 HYPERLIPIDEMIA: Primary | ICD-10-CM

## 2025-01-27 RX ORDER — PRAVASTATIN SODIUM 10 MG/1
10 TABLET ORAL NIGHTLY
Qty: 90 TABLET | Refills: 3 | Status: SHIPPED | OUTPATIENT
Start: 2025-01-27 | End: 2026-01-27

## 2025-02-06 ENCOUNTER — TELEPHONE (OUTPATIENT)
Dept: PRIMARY CARE | Facility: CLINIC | Age: 60
End: 2025-02-06
Payer: COMMERCIAL

## 2025-02-27 ENCOUNTER — APPOINTMENT (OUTPATIENT)
Dept: OPHTHALMOLOGY | Facility: CLINIC | Age: 60
End: 2025-02-27
Payer: COMMERCIAL

## 2025-04-14 ENCOUNTER — TELEMEDICINE (OUTPATIENT)
Dept: PRIMARY CARE | Facility: CLINIC | Age: 60
End: 2025-04-14
Payer: COMMERCIAL

## 2025-04-14 VITALS — BODY MASS INDEX: 38.97 KG/M2 | WEIGHT: 220 LBS

## 2025-04-14 DIAGNOSIS — R53.83 TIRED: ICD-10-CM

## 2025-04-14 DIAGNOSIS — E66.812 CLASS 2 SEVERE OBESITY DUE TO EXCESS CALORIES WITH SERIOUS COMORBIDITY AND BODY MASS INDEX (BMI) OF 38.0 TO 38.9 IN ADULT: ICD-10-CM

## 2025-04-14 DIAGNOSIS — E11.9 TYPE 2 DIABETES MELLITUS WITHOUT COMPLICATION, WITHOUT LONG-TERM CURRENT USE OF INSULIN: Primary | ICD-10-CM

## 2025-04-14 DIAGNOSIS — E66.01 CLASS 2 SEVERE OBESITY DUE TO EXCESS CALORIES WITH SERIOUS COMORBIDITY AND BODY MASS INDEX (BMI) OF 38.0 TO 38.9 IN ADULT: ICD-10-CM

## 2025-04-14 PROCEDURE — 99213 OFFICE O/P EST LOW 20 MIN: CPT | Performed by: FAMILY MEDICINE

## 2025-04-14 PROCEDURE — 1036F TOBACCO NON-USER: CPT | Performed by: FAMILY MEDICINE

## 2025-04-14 RX ORDER — TIRZEPATIDE 12.5 MG/.5ML
12.5 INJECTION, SOLUTION SUBCUTANEOUS WEEKLY
Qty: 2 ML | Refills: 6 | Status: SHIPPED | OUTPATIENT
Start: 2025-04-14

## 2025-04-14 RX ORDER — TIRZEPATIDE 10 MG/.5ML
10 INJECTION, SOLUTION SUBCUTANEOUS WEEKLY
Qty: 2 ML | Refills: 2 | Status: CANCELLED
Start: 2025-04-14

## 2025-04-14 ASSESSMENT — ENCOUNTER SYMPTOMS
FEVER: 0
DIFFICULTY URINATING: 0
OCCASIONAL FEELINGS OF UNSTEADINESS: 0
DIZZINESS: 0
SHORTNESS OF BREATH: 0
ENDOCRINE NEGATIVE: 1
DIARRHEA: 0
LOSS OF SENSATION IN FEET: 0
DEPRESSION: 0
NAUSEA: 0

## 2025-04-14 NOTE — PROGRESS NOTES
Subjective   Patient ID: Liz Crouch is a 59 y.o. female who presents for Weight Check.    HPI   The pt presents to the clinic, via telehealth visit, for a weight check. Past medical hx of HLD, metabolic syndrome, type 2 DM, hypothyroidism, obesity, vitamin D deficiency, menopausal syndrome, and right knee osteoarthritis. The pt was at her home for the duration of the telehealth visit. The telehealth visit was 10-15 minutes in length.  Pt consented for telehealth visit.    -Obesity/Type 2 DM: The pt reported that she was 220 lbs (BMI: 38.97) when she checked on her own scale this morning. She lost 13 lbs since mid-November 2024. She has been utilizing healthy diet, exercise, and medication to facilitate weight loss. Reports increased energy levels and hopes to further increase exercise via electronic boxing machine. In addition, she has been consulting with a weight loss  on a regular basis. Notably, pt has hx of type 2 DM. Her blood sugars have been fairly controlled recently (hemoglobin A1C of 5.3 % in October 2024). She continues on Mounjaro injections 10 mg weekly to facilitate weight loss and control blood sugars. Doing well on this dose of medication and no side-effects reported. She believes that she could benefit from an increased dose of Mounjaro injections. As such, pt received dose increase (from 10 mg weekly to 12.5 mg weekly) for her Mounjaro injections to better facilitate weight loss and control blood sugars. She will be re-evaluated in 6 months.    Review of Systems   Constitutional:  Negative for fever.        Also see HPI   Eyes:  Negative for visual disturbance.   Respiratory:  Negative for shortness of breath.    Cardiovascular:  Negative for chest pain.   Gastrointestinal:  Negative for diarrhea and nausea.   Endocrine: Negative.    Genitourinary:  Negative for difficulty urinating.   Skin:  Negative for rash.   Neurological:  Negative for dizziness.        No focal deficits    Psychiatric/Behavioral:  Negative for suicidal ideas.    All other systems reviewed and are negative.      Objective   Wt 99.8 kg (220 lb)   BMI 38.97 kg/m²     Physical Exam  Constitutional:       Appearance: Normal appearance.   HENT:      Head: Normocephalic and atraumatic.   Pulmonary:      Effort: Pulmonary effort is normal. No respiratory distress.   Neurological:      Mental Status: She is alert and oriented to person, place, and time.   Psychiatric:         Mood and Affect: Mood normal.         Behavior: Behavior normal.         Thought Content: Thought content normal.         Judgment: Judgment normal.         Assessment/Plan   Diagnoses and all orders for this visit:  Type 2 diabetes mellitus without complication, without long-term current use of insulin  -     tirzepatide (Mounjaro) 12.5 mg/0.5 mL pen injector; Inject 12.5 mg under the skin 1 (one) time per week.  -     CBC and Auto Differential; Future  -     Comprehensive Metabolic Panel; Future  -     Hemoglobin A1C; Future  -     Lipid Panel; Future  -     Albumin-Creatinine Ratio, Urine Random; Future  -     Follow Up In Primary Care - Established; Future  Tired  -     Follow Up In Primary Care - Established  Class 2 severe obesity due to excess calories with serious comorbidity and body mass index (BMI) of 38.0 to 38.9 in adult         Scribe Attestation  By signing my name below, IDebra Scribe   attest that this documentation has been prepared under the direction and in the presence of Scott Chaudhary DO.

## 2025-04-15 LAB
ALBUMIN SERPL-MCNC: 4.7 G/DL (ref 3.6–5.1)
ALBUMIN/CREAT UR: 11 MG/G CREAT
ALP SERPL-CCNC: 92 U/L (ref 37–153)
ALT SERPL-CCNC: 10 U/L (ref 6–29)
ANION GAP SERPL CALCULATED.4IONS-SCNC: 10 MMOL/L (CALC) (ref 7–17)
AST SERPL-CCNC: 12 U/L (ref 10–35)
BASOPHILS # BLD AUTO: 63 CELLS/UL (ref 0–200)
BASOPHILS NFR BLD AUTO: 0.9 %
BILIRUB SERPL-MCNC: 0.3 MG/DL (ref 0.2–1.2)
BUN SERPL-MCNC: 19 MG/DL (ref 7–25)
CALCIUM SERPL-MCNC: 10.1 MG/DL (ref 8.6–10.4)
CHLORIDE SERPL-SCNC: 105 MMOL/L (ref 98–110)
CHOLEST SERPL-MCNC: 207 MG/DL
CHOLEST/HDLC SERPL: 3.9 (CALC)
CO2 SERPL-SCNC: 27 MMOL/L (ref 20–32)
CREAT SERPL-MCNC: 0.72 MG/DL (ref 0.5–1.03)
CREAT UR-MCNC: 123 MG/DL (ref 20–275)
EGFRCR SERPLBLD CKD-EPI 2021: 97 ML/MIN/1.73M2
EOSINOPHIL # BLD AUTO: 245 CELLS/UL (ref 15–500)
EOSINOPHIL NFR BLD AUTO: 3.5 %
ERYTHROCYTE [DISTWIDTH] IN BLOOD BY AUTOMATED COUNT: 13.6 % (ref 11–15)
EST. AVERAGE GLUCOSE BLD GHB EST-MCNC: 111 MG/DL
EST. AVERAGE GLUCOSE BLD GHB EST-SCNC: 6.2 MMOL/L
GLUCOSE SERPL-MCNC: 84 MG/DL (ref 65–99)
HBA1C MFR BLD: 5.5 %
HCT VFR BLD AUTO: 44.9 % (ref 35–45)
HDLC SERPL-MCNC: 53 MG/DL
HGB BLD-MCNC: 14.4 G/DL (ref 11.7–15.5)
LDLC SERPL CALC-MCNC: 127 MG/DL (CALC)
LYMPHOCYTES # BLD AUTO: 2646 CELLS/UL (ref 850–3900)
LYMPHOCYTES NFR BLD AUTO: 37.8 %
MCH RBC QN AUTO: 27.9 PG (ref 27–33)
MCHC RBC AUTO-ENTMCNC: 32.1 G/DL (ref 32–36)
MCV RBC AUTO: 87 FL (ref 80–100)
MICROALBUMIN UR-MCNC: 1.4 MG/DL
MONOCYTES # BLD AUTO: 406 CELLS/UL (ref 200–950)
MONOCYTES NFR BLD AUTO: 5.8 %
NEUTROPHILS # BLD AUTO: 3640 CELLS/UL (ref 1500–7800)
NEUTROPHILS NFR BLD AUTO: 52 %
NONHDLC SERPL-MCNC: 154 MG/DL (CALC)
PLATELET # BLD AUTO: 263 THOUSAND/UL (ref 140–400)
PMV BLD REES-ECKER: 10.7 FL (ref 7.5–12.5)
POTASSIUM SERPL-SCNC: 4.1 MMOL/L (ref 3.5–5.3)
PROT SERPL-MCNC: 7.5 G/DL (ref 6.1–8.1)
RBC # BLD AUTO: 5.16 MILLION/UL (ref 3.8–5.1)
SODIUM SERPL-SCNC: 142 MMOL/L (ref 135–146)
TRIGL SERPL-MCNC: 155 MG/DL
WBC # BLD AUTO: 7 THOUSAND/UL (ref 3.8–10.8)

## 2025-04-28 ENCOUNTER — APPOINTMENT (OUTPATIENT)
Dept: ORTHOPEDIC SURGERY | Facility: CLINIC | Age: 60
End: 2025-04-28
Payer: COMMERCIAL

## 2025-05-22 ENCOUNTER — APPOINTMENT (OUTPATIENT)
Dept: CARDIOLOGY | Facility: CLINIC | Age: 60
End: 2025-05-22
Payer: COMMERCIAL

## 2025-05-29 ENCOUNTER — OFFICE VISIT (OUTPATIENT)
Dept: CARDIOLOGY | Facility: CLINIC | Age: 60
End: 2025-05-29
Payer: COMMERCIAL

## 2025-05-29 VITALS
WEIGHT: 220 LBS | HEART RATE: 91 BPM | SYSTOLIC BLOOD PRESSURE: 116 MMHG | BODY MASS INDEX: 38.97 KG/M2 | OXYGEN SATURATION: 98 % | DIASTOLIC BLOOD PRESSURE: 79 MMHG

## 2025-05-29 DIAGNOSIS — I25.10 CORONARY ARTERY DISEASE, UNSPECIFIED VESSEL OR LESION TYPE, UNSPECIFIED WHETHER ANGINA PRESENT, UNSPECIFIED WHETHER NATIVE OR TRANSPLANTED HEART: ICD-10-CM

## 2025-05-29 DIAGNOSIS — E78.5 HYPERLIPIDEMIA, UNSPECIFIED HYPERLIPIDEMIA TYPE: Primary | ICD-10-CM

## 2025-05-29 PROCEDURE — 3074F SYST BP LT 130 MM HG: CPT | Performed by: NURSE PRACTITIONER

## 2025-05-29 PROCEDURE — 1036F TOBACCO NON-USER: CPT | Performed by: NURSE PRACTITIONER

## 2025-05-29 PROCEDURE — 99214 OFFICE O/P EST MOD 30 MIN: CPT | Performed by: NURSE PRACTITIONER

## 2025-05-29 PROCEDURE — 3078F DIAST BP <80 MM HG: CPT | Performed by: NURSE PRACTITIONER

## 2025-05-29 RX ORDER — ROSUVASTATIN CALCIUM 20 MG/1
20 TABLET, COATED ORAL DAILY
Qty: 90 TABLET | Refills: 3 | Status: SHIPPED | OUTPATIENT
Start: 2025-05-29 | End: 2026-05-29

## 2025-05-29 RX ORDER — ROSUVASTATIN CALCIUM 40 MG/1
40 TABLET, COATED ORAL DAILY
Qty: 90 TABLET | Refills: 3 | Status: SHIPPED | OUTPATIENT
Start: 2025-05-29 | End: 2025-05-29

## 2025-05-29 RX ORDER — NAPROXEN SODIUM 220 MG/1
81 TABLET, FILM COATED ORAL DAILY
Qty: 90 TABLET | Refills: 3 | Status: SHIPPED | OUTPATIENT
Start: 2025-05-29 | End: 2026-05-29

## 2025-05-29 ASSESSMENT — ENCOUNTER SYMPTOMS
MUSCULOSKELETAL NEGATIVE: 1
CONSTITUTIONAL NEGATIVE: 1
CARDIOVASCULAR NEGATIVE: 1
RESPIRATORY NEGATIVE: 1
NEUROLOGICAL NEGATIVE: 1
GASTROINTESTINAL NEGATIVE: 1

## 2025-05-29 NOTE — PROGRESS NOTES
Chief Complaint:   Follow-up    History Of Present Illness:    .Ms Crouch returns in follow up.  Denies chest pain, sob, palpitations or pedal edema.           Last Recorded Vitals:  Blood pressure 116/79, pulse 91, weight 99.8 kg (220 lb), SpO2 98%.     Past Medical History:  Medical History[1]     Past Surgical History:  Surgical History[2]    Social History:  Social History[3]    Family History:  Family History[4]      Allergies:  Sulfa (sulfonamide antibiotics) and Ciprofloxacin    Outpatient Medications:  Current Medications[5]     Physical Exam:  Cardiovascular:      PMI at left midclavicular line. Normal rate. Regular rhythm. Normal S1. Normal S2.       Murmurs: There is no murmur.      No gallop.  No click. No rub.   Pulses:     Intact distal pulses.   Edema:     Peripheral edema absent.         ROS:  Review of Systems   Constitutional: Negative.   Cardiovascular: Negative.    Respiratory: Negative.     Skin: Negative.    Musculoskeletal: Negative.    Gastrointestinal: Negative.    Genitourinary: Negative.    Neurological: Negative.           Last Labs: reviewed  CBC -  Lab Results   Component Value Date    WBC 7.0 04/14/2025    HGB 14.4 04/14/2025    HCT 44.9 04/14/2025    MCV 87.0 04/14/2025     04/14/2025       CMP -  Lab Results   Component Value Date    CALCIUM 10.1 04/14/2025    PROT 7.5 04/14/2025    ALBUMIN 4.7 04/14/2025    AST 12 04/14/2025    ALT 10 04/14/2025    ALKPHOS 92 04/14/2025    BILITOT 0.3 04/14/2025       LIPID PANEL -   Lab Results   Component Value Date    CHOL 207 (H) 04/14/2025    TRIG 155 (H) 04/14/2025    HDL 53 04/14/2025    CHHDL 3.9 04/14/2025    VLDL 44 (H) 10/14/2024    NHDL 154 (H) 04/14/2025       RENAL FUNCTION PANEL -   Lab Results   Component Value Date    GLUCOSE 84 04/14/2025     04/14/2025    K 4.1 04/14/2025     04/14/2025    CO2 27 04/14/2025    ANIONGAP 10 04/14/2025    BUN 19 04/14/2025    CREATININE 0.72 04/14/2025    CALCIUM 10.1 04/14/2025     ALBUMIN 4.7 2025        Lab Results   Component Value Date    HGBA1C 5.5 2025         Assessment/Plan   Problem List Items Addressed This Visit    None      1.  Hypertension.  Patient previously not diagnosed as being hypertensive.  Blood pressure during first office visit 174/98, but well controlled today.  2.  Borderline type 2 diabetes.  The patient has been prescribed Mounjaro for weight loss.  3.  Hyperlipidemia.  The patient has been on simvastatin 20 mg daily for approximately 10 years.  Most recent lipid panel iwas not optimized on low dose pravastatin.  Will change simvastatin 20 mg daily to rosuvastatin 20 mg daily.  4.  Coronary artery disease.  The patient did have a CT coronary calcium score of 279 performed 2024.    Stress echo 2024     Summary:   1. The resting ejection fraction was estimated at 60 to 65% with a peak exercise ejection fraction estimated at 65 to 70%.   2. Normal global left ventricular systolic function.   3. Adequate level of stress achieved.   4. No clinical, electrocardiographic or echocardiographic evidence for ischemia at a maximal workload.     5.  Non-smoker.  6.  Status post D&C for miscarriage.  7.  Bilateral knee DJD.             Komal Lisa, APRN-CNP         [1]   Past Medical History:  Diagnosis Date    Abnormal mammogram 2023    Displaced fracture of lateral end of left clavicle, initial encounter for closed fracture     2 times    DM (diabetes mellitus) (Multi)     High cholesterol     Urinary tract infection Since childhood    Varicella Choldhood   [2]   Past Surgical History:  Procedure Laterality Date    D&C FIRST TRIMESTER / TX INCOMPLETE / MISSED / SEPTIC / INDUCED       LASER ABLATION  2014   [3]   Social History  Socioeconomic History    Marital status:      Spouse name: arthur    Number of children: 4   Occupational History    Occupation:    Tobacco Use    Smoking status: Never     Passive exposure: Never     Smokeless tobacco: Never   Vaping Use    Vaping status: Never Used   Substance and Sexual Activity    Alcohol use: Not Currently     Comment: Special occasions    Drug use: Never    Sexual activity: Yes     Partners: Male     Birth control/protection: Male Sterilization, None     Comment:  had vasectomy     Social Drivers of Health     Financial Resource Strain: Low Risk  (9/30/2023)    Received from [facility]    Overall Financial Resource Strain (CARDIA)     Difficulty of Paying Living Expenses: Not very hard   Food Insecurity: Patient Declined (9/30/2023)    Received from [facility]    Hunger Vital Sign     Worried About Running Out of Food in the Last Year: Patient declined     Ran Out of Food in the Last Year: Patient declined   Transportation Needs: Patient Declined (9/30/2023)    Received from [facility]    PRAPARE - Transportation     Lack of Transportation (Medical): Patient declined     Lack of Transportation (Non-Medical): Patient declined   Physical Activity: Insufficiently Active (9/30/2023)    Received from [facility]    Exercise Vital Sign     Days of Exercise per Week: 3 days     Minutes of Exercise per Session: 40 min   Stress: No Stress Concern Present (9/30/2023)    Received from [facility]    Citizen of [place] of Occupational Health - Occupational Stress Questionnaire     Feeling of Stress : Only a little   Social Connections: Unknown (9/30/2023)    Received from [facility]    Social Connection and Isolation Panel [NHANES]     Frequency of Communication with Friends and Family: Three times a week     Frequency of Social Gatherings with Friends and Family: Once a week     Attends Faith Services: Patient declined     Active Member of Clubs or Organizations: Patient declined     Attends Club or Organization Meetings: Patient declined     Marital Status:    Housing Stability: Unknown (9/30/2023)    Received from [facility]    Housing  Stability Vital Sign     Unable to Pay for Housing in the Last Year: No     Unstable Housing in the Last Year: No   [4]   Family History  Problem Relation Name Age of Onset    Cancer Mother Denise Child     Breast cancer Mother Denise Child 63    Hyperlipidemia Mother Denise Child     Kidney disease Mother Denise Maciascs     Cataracts Mother Denise Child     Cancer Father Michael Child     Prostate cancer Father Michael Child     Hyperlipidemia Father Michael Child     Heart failure Father Michael Child         dialysis    Anemia Father Micheal Pocsideepak     Arrhythmia Father Michael Pocsideepak     Stroke Father Michael Pocsarahcs     Cataracts Father Michael Pocashley     No Known Problems Sister      No Known Problems Brother      No Known Problems Daughter      No Known Problems Daughter      Diabetes Son Srikanth White     No Known Problems Son      Hyperlipidemia Sibling     [5]   Current Outpatient Medications   Medication Sig Dispense Refill    ASCORBIC ACID, VITAMIN C, ORAL Take 1 tablet by mouth once daily.      aspirin 81 mg chewable tablet Chew 1 tablet (81 mg) once daily. 90 tablet 3    cranberry 400 mg capsule Take 1 capsule by mouth once daily.      cyanocobalamin (Vitamin B-12) 1,000 mcg tablet Take 1 tablet (1,000 mcg) by mouth once daily.      glucosam/teresa-msm1/C/jennifer/bosw (OSTEO BI-FLEX TRIPLE STRENGTH ORAL) Take 2 capsules by mouth once daily.      multivitamin with minerals iron-free (Multivitamin 50 Plus) Take 1 tablet by mouth once daily.      pravastatin (Pravachol) 10 mg tablet Take 1 tablet (10 mg) by mouth once daily at bedtime. 90 tablet 3    tirzepatide (Mounjaro) 12.5 mg/0.5 mL pen injector Inject 12.5 mg under the skin 1 (one) time per week. 2 mL 6    ondansetron ODT (Zofran-ODT) 4 mg disintegrating tablet Take 1 tablet (4 mg) by mouth every 8 hours if needed for nausea or vomiting. Dissolve on tongue 30 tablet 3     No current facility-administered medications for this visit.

## 2025-10-20 ENCOUNTER — APPOINTMENT (OUTPATIENT)
Dept: PRIMARY CARE | Facility: CLINIC | Age: 60
End: 2025-10-20
Payer: COMMERCIAL

## 2025-12-04 ENCOUNTER — APPOINTMENT (OUTPATIENT)
Dept: CARDIOLOGY | Facility: CLINIC | Age: 60
End: 2025-12-04
Payer: COMMERCIAL

## 2025-12-15 ENCOUNTER — APPOINTMENT (OUTPATIENT)
Dept: OPHTHALMOLOGY | Facility: CLINIC | Age: 60
End: 2025-12-15
Payer: COMMERCIAL

## 2025-12-18 ENCOUNTER — APPOINTMENT (OUTPATIENT)
Dept: OPHTHALMOLOGY | Facility: CLINIC | Age: 60
End: 2025-12-18
Payer: COMMERCIAL